# Patient Record
Sex: MALE | Race: WHITE | NOT HISPANIC OR LATINO | Employment: OTHER | URBAN - METROPOLITAN AREA
[De-identification: names, ages, dates, MRNs, and addresses within clinical notes are randomized per-mention and may not be internally consistent; named-entity substitution may affect disease eponyms.]

---

## 2019-04-29 ENCOUNTER — TRANSFERRED RECORDS (OUTPATIENT)
Dept: HEALTH INFORMATION MANAGEMENT | Facility: CLINIC | Age: 79
End: 2019-04-29

## 2019-05-03 RX ORDER — ALLOPURINOL 100 MG/1
100 TABLET ORAL 2 TIMES DAILY
COMMUNITY

## 2019-05-03 RX ORDER — LATANOPROST 50 UG/ML
1 SOLUTION/ DROPS OPHTHALMIC AT BEDTIME
COMMUNITY

## 2019-05-03 RX ORDER — LOSARTAN POTASSIUM 100 MG/1
100 TABLET ORAL DAILY
COMMUNITY

## 2019-05-03 RX ORDER — HYDROCHLOROTHIAZIDE 25 MG/1
25 TABLET ORAL DAILY
COMMUNITY

## 2019-05-03 RX ORDER — METOPROLOL SUCCINATE 25 MG/1
25 TABLET, EXTENDED RELEASE ORAL DAILY
COMMUNITY

## 2019-05-03 RX ORDER — ASPIRIN 81 MG/1
81 TABLET ORAL DAILY
Status: ON HOLD | COMMUNITY
End: 2019-05-07

## 2019-05-03 RX ORDER — VITS A,C,E/LUTEIN/MINERALS 300MCG-200
1 TABLET ORAL 2 TIMES DAILY
Status: ON HOLD | COMMUNITY
End: 2019-05-06

## 2019-05-03 RX ORDER — SIMVASTATIN 40 MG
40 TABLET ORAL AT BEDTIME
COMMUNITY

## 2019-05-03 RX ORDER — TAMSULOSIN HYDROCHLORIDE 0.4 MG/1
0.4 CAPSULE ORAL AT BEDTIME
COMMUNITY

## 2019-05-03 RX ORDER — CHOLECALCIFEROL (VITAMIN D3) 50 MCG
1 TABLET ORAL DAILY
Status: ON HOLD | COMMUNITY
End: 2019-05-06

## 2019-05-04 ENCOUNTER — ANESTHESIA EVENT (OUTPATIENT)
Dept: SURGERY | Facility: CLINIC | Age: 79
DRG: 470 | End: 2019-05-04
Payer: COMMERCIAL

## 2019-05-06 ENCOUNTER — HOSPITAL ENCOUNTER (INPATIENT)
Facility: CLINIC | Age: 79
LOS: 1 days | Discharge: HOME | DRG: 470 | End: 2019-05-07
Attending: ORTHOPAEDIC SURGERY | Admitting: ORTHOPAEDIC SURGERY
Payer: COMMERCIAL

## 2019-05-06 ENCOUNTER — APPOINTMENT (OUTPATIENT)
Dept: GENERAL RADIOLOGY | Facility: CLINIC | Age: 79
DRG: 470 | End: 2019-05-06
Attending: ORTHOPAEDIC SURGERY
Payer: COMMERCIAL

## 2019-05-06 ENCOUNTER — ANESTHESIA (OUTPATIENT)
Dept: SURGERY | Facility: CLINIC | Age: 79
DRG: 470 | End: 2019-05-06
Payer: COMMERCIAL

## 2019-05-06 DIAGNOSIS — Z96.642 STATUS POST LEFT HIP REPLACEMENT: Primary | ICD-10-CM

## 2019-05-06 LAB
ABO + RH BLD: NORMAL
ABO + RH BLD: NORMAL
BLD GP AB SCN SERPL QL: NORMAL
BLOOD BANK CMNT PATIENT-IMP: NORMAL
CREAT SERPL-MCNC: 1.12 MG/DL (ref 0.66–1.25)
GFR SERPL CREATININE-BSD FRML MDRD: 62 ML/MIN/{1.73_M2}
HGB BLD-MCNC: 14.3 G/DL (ref 13.3–17.7)
PLATELET # BLD AUTO: 220 10E9/L (ref 150–450)
POTASSIUM SERPL-SCNC: 3.6 MMOL/L (ref 3.4–5.3)
SPECIMEN EXP DATE BLD: NORMAL

## 2019-05-06 PROCEDURE — 36000065 ZZH SURGERY LEVEL 4 W FLUORO 1ST 30 MIN: Performed by: ORTHOPAEDIC SURGERY

## 2019-05-06 PROCEDURE — 88311 DECALCIFY TISSUE: CPT | Performed by: ORTHOPAEDIC SURGERY

## 2019-05-06 PROCEDURE — 12000000 ZZH R&B MED SURG/OB

## 2019-05-06 PROCEDURE — 25800025 ZZH RX 258: Performed by: ORTHOPAEDIC SURGERY

## 2019-05-06 PROCEDURE — C1713 ANCHOR/SCREW BN/BN,TIS/BN: HCPCS | Performed by: ORTHOPAEDIC SURGERY

## 2019-05-06 PROCEDURE — 25000128 H RX IP 250 OP 636: Performed by: ORTHOPAEDIC SURGERY

## 2019-05-06 PROCEDURE — 25000125 ZZHC RX 250: Performed by: PHYSICIAN ASSISTANT

## 2019-05-06 PROCEDURE — 88305 TISSUE EXAM BY PATHOLOGIST: CPT | Mod: 26 | Performed by: ORTHOPAEDIC SURGERY

## 2019-05-06 PROCEDURE — 25000566 ZZH SEVOFLURANE, EA 15 MIN: Performed by: ORTHOPAEDIC SURGERY

## 2019-05-06 PROCEDURE — 0SRB03A REPLACEMENT OF LEFT HIP JOINT WITH CERAMIC SYNTHETIC SUBSTITUTE, UNCEMENTED, OPEN APPROACH: ICD-10-PCS | Performed by: ORTHOPAEDIC SURGERY

## 2019-05-06 PROCEDURE — 27210794 ZZH OR GENERAL SUPPLY STERILE: Performed by: ORTHOPAEDIC SURGERY

## 2019-05-06 PROCEDURE — 25000125 ZZHC RX 250: Performed by: NURSE ANESTHETIST, CERTIFIED REGISTERED

## 2019-05-06 PROCEDURE — 86901 BLOOD TYPING SEROLOGIC RH(D): CPT | Performed by: ANESTHESIOLOGY

## 2019-05-06 PROCEDURE — 37000008 ZZH ANESTHESIA TECHNICAL FEE, 1ST 30 MIN: Performed by: ORTHOPAEDIC SURGERY

## 2019-05-06 PROCEDURE — 25000128 H RX IP 250 OP 636: Performed by: PHYSICIAN ASSISTANT

## 2019-05-06 PROCEDURE — 25000132 ZZH RX MED GY IP 250 OP 250 PS 637: Performed by: ORTHOPAEDIC SURGERY

## 2019-05-06 PROCEDURE — C1776 JOINT DEVICE (IMPLANTABLE): HCPCS | Performed by: ORTHOPAEDIC SURGERY

## 2019-05-06 PROCEDURE — 25800030 ZZH RX IP 258 OP 636: Performed by: ANESTHESIOLOGY

## 2019-05-06 PROCEDURE — 93005 ELECTROCARDIOGRAM TRACING: CPT

## 2019-05-06 PROCEDURE — 36415 COLL VENOUS BLD VENIPUNCTURE: CPT | Performed by: PHYSICIAN ASSISTANT

## 2019-05-06 PROCEDURE — 85049 AUTOMATED PLATELET COUNT: CPT | Performed by: PHYSICIAN ASSISTANT

## 2019-05-06 PROCEDURE — 82565 ASSAY OF CREATININE: CPT | Performed by: PHYSICIAN ASSISTANT

## 2019-05-06 PROCEDURE — 86900 BLOOD TYPING SEROLOGIC ABO: CPT | Performed by: ANESTHESIOLOGY

## 2019-05-06 PROCEDURE — 86850 RBC ANTIBODY SCREEN: CPT | Performed by: ANESTHESIOLOGY

## 2019-05-06 PROCEDURE — 99207 ZZC CONSULT E&M CHANGED TO INITIAL LEVEL: CPT | Performed by: PHYSICIAN ASSISTANT

## 2019-05-06 PROCEDURE — 85018 HEMOGLOBIN: CPT | Performed by: PHYSICIAN ASSISTANT

## 2019-05-06 PROCEDURE — 25000132 ZZH RX MED GY IP 250 OP 250 PS 637: Performed by: PHYSICIAN ASSISTANT

## 2019-05-06 PROCEDURE — 40000985 XR PELVIS AND HIP PORTABLE BILATERAL 2 VIEWS

## 2019-05-06 PROCEDURE — 99222 1ST HOSP IP/OBS MODERATE 55: CPT | Performed by: PHYSICIAN ASSISTANT

## 2019-05-06 PROCEDURE — 40000277 XR SURGERY CARM FLUORO LESS THAN 5 MIN W STILLS

## 2019-05-06 PROCEDURE — 93010 ELECTROCARDIOGRAM REPORT: CPT | Performed by: INTERNAL MEDICINE

## 2019-05-06 PROCEDURE — 25800030 ZZH RX IP 258 OP 636: Performed by: ORTHOPAEDIC SURGERY

## 2019-05-06 PROCEDURE — 25800030 ZZH RX IP 258 OP 636: Performed by: NURSE ANESTHETIST, CERTIFIED REGISTERED

## 2019-05-06 PROCEDURE — 0QH634Z INSERTION OF INTERNAL FIXATION DEVICE INTO RIGHT UPPER FEMUR, PERCUTANEOUS APPROACH: ICD-10-PCS | Performed by: ORTHOPAEDIC SURGERY

## 2019-05-06 PROCEDURE — 25000128 H RX IP 250 OP 636: Performed by: ANESTHESIOLOGY

## 2019-05-06 PROCEDURE — 25800030 ZZH RX IP 258 OP 636: Performed by: PHYSICIAN ASSISTANT

## 2019-05-06 PROCEDURE — 84132 ASSAY OF SERUM POTASSIUM: CPT | Performed by: PHYSICIAN ASSISTANT

## 2019-05-06 PROCEDURE — P9041 ALBUMIN (HUMAN),5%, 50ML: HCPCS | Performed by: NURSE ANESTHETIST, CERTIFIED REGISTERED

## 2019-05-06 PROCEDURE — 88305 TISSUE EXAM BY PATHOLOGIST: CPT | Performed by: ORTHOPAEDIC SURGERY

## 2019-05-06 PROCEDURE — 25000128 H RX IP 250 OP 636: Performed by: NURSE ANESTHETIST, CERTIFIED REGISTERED

## 2019-05-06 PROCEDURE — 25000125 ZZHC RX 250: Performed by: ORTHOPAEDIC SURGERY

## 2019-05-06 PROCEDURE — 36000063 ZZH SURGERY LEVEL 4 EA 15 ADDTL MIN: Performed by: ORTHOPAEDIC SURGERY

## 2019-05-06 PROCEDURE — 88311 DECALCIFY TISSUE: CPT | Mod: 26 | Performed by: ORTHOPAEDIC SURGERY

## 2019-05-06 PROCEDURE — 71000013 ZZH RECOVERY PHASE 1 LEVEL 1 EA ADDTL HR: Performed by: ORTHOPAEDIC SURGERY

## 2019-05-06 PROCEDURE — 37000009 ZZH ANESTHESIA TECHNICAL FEE, EACH ADDTL 15 MIN: Performed by: ORTHOPAEDIC SURGERY

## 2019-05-06 PROCEDURE — 40000170 ZZH STATISTIC PRE-PROCEDURE ASSESSMENT II: Performed by: ORTHOPAEDIC SURGERY

## 2019-05-06 PROCEDURE — 71000012 ZZH RECOVERY PHASE 1 LEVEL 1 FIRST HR: Performed by: ORTHOPAEDIC SURGERY

## 2019-05-06 DEVICE — IMPLANTABLE DEVICE: Type: IMPLANTABLE DEVICE | Site: FEMUR | Status: FUNCTIONAL

## 2019-05-06 DEVICE — IMP CUP ACE PINNACLE 58MM 1217-22-058: Type: IMPLANTABLE DEVICE | Site: HIP | Status: FUNCTIONAL

## 2019-05-06 DEVICE — IMP INSERT HIP DEPUY PINNACLE ALTRX 36MM +4 1221-36-458: Type: IMPLANTABLE DEVICE | Site: HIP | Status: FUNCTIONAL

## 2019-05-06 DEVICE — IMP SCR BONE CAN ACE 6.5X35MM 1217-35-500: Type: IMPLANTABLE DEVICE | Site: HIP | Status: FUNCTIONAL

## 2019-05-06 DEVICE — IMP APEX HOLE ELIMINATOR HIP DEPUY DURALOC 1246-03-000: Type: IMPLANTABLE DEVICE | Site: HIP | Status: FUNCTIONAL

## 2019-05-06 DEVICE — IMPLANTABLE DEVICE: Type: IMPLANTABLE DEVICE | Site: HIP | Status: FUNCTIONAL

## 2019-05-06 RX ORDER — ALLOPURINOL 100 MG/1
100 TABLET ORAL 2 TIMES DAILY
Status: DISCONTINUED | OUTPATIENT
Start: 2019-05-06 | End: 2019-05-07 | Stop reason: HOSPADM

## 2019-05-06 RX ORDER — PROPOFOL 10 MG/ML
INJECTION, EMULSION INTRAVENOUS PRN
Status: DISCONTINUED | OUTPATIENT
Start: 2019-05-06 | End: 2019-05-06

## 2019-05-06 RX ORDER — ACETAMINOPHEN 325 MG/1
650 TABLET ORAL EVERY 4 HOURS PRN
Status: DISCONTINUED | OUTPATIENT
Start: 2019-05-09 | End: 2019-05-07 | Stop reason: HOSPADM

## 2019-05-06 RX ORDER — LIDOCAINE HYDROCHLORIDE 20 MG/ML
JELLY TOPICAL PRN
Status: DISCONTINUED | OUTPATIENT
Start: 2019-05-06 | End: 2019-05-06 | Stop reason: HOSPADM

## 2019-05-06 RX ORDER — METOPROLOL SUCCINATE 25 MG/1
25 TABLET, EXTENDED RELEASE ORAL DAILY
Status: DISCONTINUED | OUTPATIENT
Start: 2019-05-07 | End: 2019-05-07 | Stop reason: HOSPADM

## 2019-05-06 RX ORDER — VANCOMYCIN HYDROCHLORIDE 1 G/20ML
INJECTION, POWDER, LYOPHILIZED, FOR SOLUTION INTRAVENOUS PRN
Status: DISCONTINUED | OUTPATIENT
Start: 2019-05-06 | End: 2019-05-06 | Stop reason: HOSPADM

## 2019-05-06 RX ORDER — HYDROMORPHONE HYDROCHLORIDE 1 MG/ML
.3-.5 INJECTION, SOLUTION INTRAMUSCULAR; INTRAVENOUS; SUBCUTANEOUS
Status: DISCONTINUED | OUTPATIENT
Start: 2019-05-06 | End: 2019-05-07 | Stop reason: HOSPADM

## 2019-05-06 RX ORDER — GLYCOPYRROLATE 0.2 MG/ML
INJECTION, SOLUTION INTRAMUSCULAR; INTRAVENOUS PRN
Status: DISCONTINUED | OUTPATIENT
Start: 2019-05-06 | End: 2019-05-06

## 2019-05-06 RX ORDER — ONDANSETRON 2 MG/ML
4 INJECTION INTRAMUSCULAR; INTRAVENOUS EVERY 30 MIN PRN
Status: DISCONTINUED | OUTPATIENT
Start: 2019-05-06 | End: 2019-05-06 | Stop reason: HOSPADM

## 2019-05-06 RX ORDER — CYANOCOBALAMIN (VITAMIN B-12) 2500 MCG
2500 TABLET, SUBLINGUAL SUBLINGUAL DAILY
COMMUNITY
End: 2024-05-24

## 2019-05-06 RX ORDER — SIMVASTATIN 40 MG
40 TABLET ORAL AT BEDTIME
Status: DISCONTINUED | OUTPATIENT
Start: 2019-05-06 | End: 2019-05-07 | Stop reason: HOSPADM

## 2019-05-06 RX ORDER — HYDROMORPHONE HYDROCHLORIDE 1 MG/ML
.3-.5 INJECTION, SOLUTION INTRAMUSCULAR; INTRAVENOUS; SUBCUTANEOUS EVERY 5 MIN PRN
Status: DISCONTINUED | OUTPATIENT
Start: 2019-05-06 | End: 2019-05-06 | Stop reason: HOSPADM

## 2019-05-06 RX ORDER — ACETAMINOPHEN 325 MG/1
975 TABLET ORAL EVERY 8 HOURS SCHEDULED
Status: DISCONTINUED | OUTPATIENT
Start: 2019-05-06 | End: 2019-05-07 | Stop reason: HOSPADM

## 2019-05-06 RX ORDER — ONDANSETRON 4 MG/1
4 TABLET, ORALLY DISINTEGRATING ORAL EVERY 30 MIN PRN
Status: DISCONTINUED | OUTPATIENT
Start: 2019-05-06 | End: 2019-05-06 | Stop reason: HOSPADM

## 2019-05-06 RX ORDER — SODIUM CHLORIDE, SODIUM LACTATE, POTASSIUM CHLORIDE, CALCIUM CHLORIDE 600; 310; 30; 20 MG/100ML; MG/100ML; MG/100ML; MG/100ML
INJECTION, SOLUTION INTRAVENOUS CONTINUOUS PRN
Status: DISCONTINUED | OUTPATIENT
Start: 2019-05-06 | End: 2019-05-06

## 2019-05-06 RX ORDER — SODIUM CHLORIDE, SODIUM LACTATE, POTASSIUM CHLORIDE, CALCIUM CHLORIDE 600; 310; 30; 20 MG/100ML; MG/100ML; MG/100ML; MG/100ML
INJECTION, SOLUTION INTRAVENOUS CONTINUOUS
Status: DISCONTINUED | OUTPATIENT
Start: 2019-05-06 | End: 2019-05-06 | Stop reason: HOSPADM

## 2019-05-06 RX ORDER — PROCHLORPERAZINE MALEATE 5 MG
5 TABLET ORAL EVERY 6 HOURS PRN
Status: DISCONTINUED | OUTPATIENT
Start: 2019-05-06 | End: 2019-05-07 | Stop reason: HOSPADM

## 2019-05-06 RX ORDER — LIDOCAINE HYDROCHLORIDE 20 MG/ML
INJECTION, SOLUTION INFILTRATION; PERINEURAL PRN
Status: DISCONTINUED | OUTPATIENT
Start: 2019-05-06 | End: 2019-05-06

## 2019-05-06 RX ORDER — EPHEDRINE SULFATE 50 MG/ML
INJECTION, SOLUTION INTRAMUSCULAR; INTRAVENOUS; SUBCUTANEOUS PRN
Status: DISCONTINUED | OUTPATIENT
Start: 2019-05-06 | End: 2019-05-06

## 2019-05-06 RX ORDER — KETOROLAC TROMETHAMINE 30 MG/ML
INJECTION, SOLUTION INTRAMUSCULAR; INTRAVENOUS PRN
Status: DISCONTINUED | OUTPATIENT
Start: 2019-05-06 | End: 2019-05-06 | Stop reason: HOSPADM

## 2019-05-06 RX ORDER — CEFAZOLIN SODIUM 2 G/100ML
2 INJECTION, SOLUTION INTRAVENOUS
Status: COMPLETED | OUTPATIENT
Start: 2019-05-06 | End: 2019-05-06

## 2019-05-06 RX ORDER — NITROGLYCERIN 0.4 MG/1
0.4 TABLET SUBLINGUAL EVERY 5 MIN PRN
COMMUNITY
End: 2024-05-24

## 2019-05-06 RX ORDER — LIDOCAINE 40 MG/G
CREAM TOPICAL
Status: DISCONTINUED | OUTPATIENT
Start: 2019-05-06 | End: 2019-05-07 | Stop reason: HOSPADM

## 2019-05-06 RX ORDER — NEOSTIGMINE METHYLSULFATE 1 MG/ML
VIAL (ML) INJECTION PRN
Status: DISCONTINUED | OUTPATIENT
Start: 2019-05-06 | End: 2019-05-06

## 2019-05-06 RX ORDER — ACETAMINOPHEN 500 MG
1000 TABLET ORAL ONCE
Status: COMPLETED | OUTPATIENT
Start: 2019-05-06 | End: 2019-05-06

## 2019-05-06 RX ORDER — DEXAMETHASONE SODIUM PHOSPHATE 4 MG/ML
INJECTION, SOLUTION INTRA-ARTICULAR; INTRALESIONAL; INTRAMUSCULAR; INTRAVENOUS; SOFT TISSUE PRN
Status: DISCONTINUED | OUTPATIENT
Start: 2019-05-06 | End: 2019-05-06

## 2019-05-06 RX ORDER — CEFAZOLIN SODIUM 1 G/3ML
1 INJECTION, POWDER, FOR SOLUTION INTRAMUSCULAR; INTRAVENOUS SEE ADMIN INSTRUCTIONS
Status: DISCONTINUED | OUTPATIENT
Start: 2019-05-06 | End: 2019-05-06 | Stop reason: HOSPADM

## 2019-05-06 RX ORDER — KETOROLAC TROMETHAMINE 15 MG/ML
15 INJECTION, SOLUTION INTRAMUSCULAR; INTRAVENOUS EVERY 6 HOURS
Status: DISCONTINUED | OUTPATIENT
Start: 2019-05-06 | End: 2019-05-07 | Stop reason: HOSPADM

## 2019-05-06 RX ORDER — AMOXICILLIN 250 MG
2 CAPSULE ORAL 2 TIMES DAILY
Status: DISCONTINUED | OUTPATIENT
Start: 2019-05-06 | End: 2019-05-07 | Stop reason: HOSPADM

## 2019-05-06 RX ORDER — METOPROLOL TARTRATE 1 MG/ML
5 INJECTION, SOLUTION INTRAVENOUS EVERY 6 HOURS
Status: DISCONTINUED | OUTPATIENT
Start: 2019-05-07 | End: 2019-05-06

## 2019-05-06 RX ORDER — LOSARTAN POTASSIUM 100 MG/1
100 TABLET ORAL DAILY
Status: DISCONTINUED | OUTPATIENT
Start: 2019-05-07 | End: 2019-05-07 | Stop reason: HOSPADM

## 2019-05-06 RX ORDER — ONDANSETRON 2 MG/ML
INJECTION INTRAMUSCULAR; INTRAVENOUS PRN
Status: DISCONTINUED | OUTPATIENT
Start: 2019-05-06 | End: 2019-05-06

## 2019-05-06 RX ORDER — ALBUMIN, HUMAN INJ 5% 5 %
SOLUTION INTRAVENOUS CONTINUOUS PRN
Status: DISCONTINUED | OUTPATIENT
Start: 2019-05-06 | End: 2019-05-06

## 2019-05-06 RX ORDER — AMOXICILLIN 250 MG
1 CAPSULE ORAL 2 TIMES DAILY
Status: DISCONTINUED | OUTPATIENT
Start: 2019-05-06 | End: 2019-05-07 | Stop reason: HOSPADM

## 2019-05-06 RX ORDER — OXYCODONE HYDROCHLORIDE 5 MG/1
5-10 TABLET ORAL
Status: DISCONTINUED | OUTPATIENT
Start: 2019-05-06 | End: 2019-05-07 | Stop reason: HOSPADM

## 2019-05-06 RX ORDER — VIT C/E/ZN/COPPR/LUTEIN/ZEAXAN 60 MG-6 MG
1 CAPSULE ORAL 2 TIMES DAILY
Status: DISCONTINUED | OUTPATIENT
Start: 2019-05-06 | End: 2019-05-07 | Stop reason: HOSPADM

## 2019-05-06 RX ORDER — ONDANSETRON 2 MG/ML
4 INJECTION INTRAMUSCULAR; INTRAVENOUS EVERY 6 HOURS PRN
Status: DISCONTINUED | OUTPATIENT
Start: 2019-05-06 | End: 2019-05-07 | Stop reason: HOSPADM

## 2019-05-06 RX ORDER — LATANOPROST 50 UG/ML
1 SOLUTION/ DROPS OPHTHALMIC AT BEDTIME
Status: DISCONTINUED | OUTPATIENT
Start: 2019-05-06 | End: 2019-05-07 | Stop reason: HOSPADM

## 2019-05-06 RX ORDER — LACTOBACILLUS RHAMNOSUS GG 10B CELL
1 CAPSULE ORAL DAILY
Status: DISCONTINUED | OUTPATIENT
Start: 2019-05-07 | End: 2019-05-07 | Stop reason: HOSPADM

## 2019-05-06 RX ORDER — TAMSULOSIN HYDROCHLORIDE 0.4 MG/1
0.4 CAPSULE ORAL AT BEDTIME
Status: DISCONTINUED | OUTPATIENT
Start: 2019-05-06 | End: 2019-05-07 | Stop reason: HOSPADM

## 2019-05-06 RX ORDER — HYDROXYZINE HYDROCHLORIDE 10 MG/1
10 TABLET, FILM COATED ORAL EVERY 6 HOURS PRN
Status: DISCONTINUED | OUTPATIENT
Start: 2019-05-06 | End: 2019-05-07 | Stop reason: HOSPADM

## 2019-05-06 RX ORDER — NALOXONE HYDROCHLORIDE 0.4 MG/ML
.1-.4 INJECTION, SOLUTION INTRAMUSCULAR; INTRAVENOUS; SUBCUTANEOUS
Status: DISCONTINUED | OUTPATIENT
Start: 2019-05-06 | End: 2019-05-06

## 2019-05-06 RX ORDER — FENTANYL CITRATE 50 UG/ML
INJECTION, SOLUTION INTRAMUSCULAR; INTRAVENOUS PRN
Status: DISCONTINUED | OUTPATIENT
Start: 2019-05-06 | End: 2019-05-06

## 2019-05-06 RX ORDER — TEMAZEPAM 7.5 MG/1
7.5 CAPSULE ORAL
Status: DISCONTINUED | OUTPATIENT
Start: 2019-05-07 | End: 2019-05-07 | Stop reason: HOSPADM

## 2019-05-06 RX ORDER — MAGNESIUM HYDROXIDE 1200 MG/15ML
LIQUID ORAL PRN
Status: DISCONTINUED | OUTPATIENT
Start: 2019-05-06 | End: 2019-05-06 | Stop reason: HOSPADM

## 2019-05-06 RX ORDER — CEFAZOLIN SODIUM 2 G/100ML
2 INJECTION, SOLUTION INTRAVENOUS EVERY 8 HOURS
Status: COMPLETED | OUTPATIENT
Start: 2019-05-06 | End: 2019-05-07

## 2019-05-06 RX ORDER — ONDANSETRON 4 MG/1
4 TABLET, ORALLY DISINTEGRATING ORAL EVERY 6 HOURS PRN
Status: DISCONTINUED | OUTPATIENT
Start: 2019-05-06 | End: 2019-05-07 | Stop reason: HOSPADM

## 2019-05-06 RX ORDER — VECURONIUM BROMIDE 1 MG/ML
INJECTION, POWDER, LYOPHILIZED, FOR SOLUTION INTRAVENOUS PRN
Status: DISCONTINUED | OUTPATIENT
Start: 2019-05-06 | End: 2019-05-06

## 2019-05-06 RX ORDER — FENTANYL CITRATE 50 UG/ML
25-50 INJECTION, SOLUTION INTRAMUSCULAR; INTRAVENOUS
Status: DISCONTINUED | OUTPATIENT
Start: 2019-05-06 | End: 2019-05-06 | Stop reason: HOSPADM

## 2019-05-06 RX ORDER — DEXTROSE MONOHYDRATE, SODIUM CHLORIDE, AND POTASSIUM CHLORIDE 50; 1.49; 4.5 G/1000ML; G/1000ML; G/1000ML
INJECTION, SOLUTION INTRAVENOUS CONTINUOUS
Status: DISCONTINUED | OUTPATIENT
Start: 2019-05-06 | End: 2019-05-07 | Stop reason: HOSPADM

## 2019-05-06 RX ORDER — PREGABALIN 75 MG/1
75 CAPSULE ORAL DAILY
Status: COMPLETED | OUTPATIENT
Start: 2019-05-06 | End: 2019-05-06

## 2019-05-06 RX ORDER — FAMOTIDINE 20 MG
1000 TABLET ORAL DAILY
COMMUNITY
End: 2024-05-24

## 2019-05-06 RX ORDER — NALOXONE HYDROCHLORIDE 0.4 MG/ML
.1-.4 INJECTION, SOLUTION INTRAMUSCULAR; INTRAVENOUS; SUBCUTANEOUS
Status: DISCONTINUED | OUTPATIENT
Start: 2019-05-06 | End: 2019-05-07 | Stop reason: HOSPADM

## 2019-05-06 RX ADMIN — LIDOCAINE HYDROCHLORIDE 80 MG: 20 INJECTION, SOLUTION INFILTRATION; PERINEURAL at 12:46

## 2019-05-06 RX ADMIN — DEXAMETHASONE SODIUM PHOSPHATE 4 MG: 4 INJECTION, SOLUTION INTRA-ARTICULAR; INTRALESIONAL; INTRAMUSCULAR; INTRAVENOUS; SOFT TISSUE at 13:26

## 2019-05-06 RX ADMIN — FENTANYL CITRATE 50 MCG: 50 INJECTION, SOLUTION INTRAMUSCULAR; INTRAVENOUS at 15:01

## 2019-05-06 RX ADMIN — Medication 0.5 MG: at 17:52

## 2019-05-06 RX ADMIN — Medication 5 MG: at 13:01

## 2019-05-06 RX ADMIN — CEFAZOLIN 1 G: 1 INJECTION, POWDER, FOR SOLUTION INTRAMUSCULAR; INTRAVENOUS at 14:55

## 2019-05-06 RX ADMIN — FENTANYL CITRATE 100 MCG: 50 INJECTION, SOLUTION INTRAMUSCULAR; INTRAVENOUS at 12:46

## 2019-05-06 RX ADMIN — PHENYLEPHRINE HYDROCHLORIDE 100 MCG: 10 INJECTION, SOLUTION INTRAMUSCULAR; INTRAVENOUS; SUBCUTANEOUS at 13:59

## 2019-05-06 RX ADMIN — SODIUM CHLORIDE, POTASSIUM CHLORIDE, SODIUM LACTATE AND CALCIUM CHLORIDE: 600; 310; 30; 20 INJECTION, SOLUTION INTRAVENOUS at 11:20

## 2019-05-06 RX ADMIN — ROCURONIUM BROMIDE 50 MG: 10 INJECTION INTRAVENOUS at 12:46

## 2019-05-06 RX ADMIN — PHENYLEPHRINE HYDROCHLORIDE 100 MCG: 10 INJECTION, SOLUTION INTRAMUSCULAR; INTRAVENOUS; SUBCUTANEOUS at 13:16

## 2019-05-06 RX ADMIN — HYDROMORPHONE HYDROCHLORIDE 0.5 MG: 1 INJECTION, SOLUTION INTRAMUSCULAR; INTRAVENOUS; SUBCUTANEOUS at 15:14

## 2019-05-06 RX ADMIN — VECURONIUM BROMIDE 1 MG: 1 INJECTION, POWDER, LYOPHILIZED, FOR SOLUTION INTRAVENOUS at 15:34

## 2019-05-06 RX ADMIN — CEFAZOLIN SODIUM 2 G: 2 INJECTION, SOLUTION INTRAVENOUS at 13:30

## 2019-05-06 RX ADMIN — SIMVASTATIN 40 MG: 40 TABLET, FILM COATED ORAL at 21:08

## 2019-05-06 RX ADMIN — Medication 5 MG: at 13:26

## 2019-05-06 RX ADMIN — FENTANYL CITRATE 50 MCG: 50 INJECTION, SOLUTION INTRAMUSCULAR; INTRAVENOUS at 15:08

## 2019-05-06 RX ADMIN — TAMSULOSIN HYDROCHLORIDE 0.4 MG: 0.4 CAPSULE ORAL at 21:08

## 2019-05-06 RX ADMIN — SODIUM CHLORIDE, POTASSIUM CHLORIDE, SODIUM LACTATE AND CALCIUM CHLORIDE: 600; 310; 30; 20 INJECTION, SOLUTION INTRAVENOUS at 12:55

## 2019-05-06 RX ADMIN — Medication 5 MG: at 13:56

## 2019-05-06 RX ADMIN — ALLOPURINOL 100 MG: 100 TABLET ORAL at 21:08

## 2019-05-06 RX ADMIN — PROPOFOL 160 MG: 10 INJECTION, EMULSION INTRAVENOUS at 12:46

## 2019-05-06 RX ADMIN — VECURONIUM BROMIDE 2 MG: 1 INJECTION, POWDER, LYOPHILIZED, FOR SOLUTION INTRAVENOUS at 14:37

## 2019-05-06 RX ADMIN — DEXMEDETOMIDINE HYDROCHLORIDE 8 MCG: 100 INJECTION, SOLUTION INTRAVENOUS at 16:49

## 2019-05-06 RX ADMIN — SODIUM CHLORIDE, POTASSIUM CHLORIDE, SODIUM LACTATE AND CALCIUM CHLORIDE: 600; 310; 30; 20 INJECTION, SOLUTION INTRAVENOUS at 14:25

## 2019-05-06 RX ADMIN — VECURONIUM BROMIDE 1 MG: 1 INJECTION, POWDER, LYOPHILIZED, FOR SOLUTION INTRAVENOUS at 16:03

## 2019-05-06 RX ADMIN — PHENYLEPHRINE HYDROCHLORIDE 100 MCG: 10 INJECTION, SOLUTION INTRAMUSCULAR; INTRAVENOUS; SUBCUTANEOUS at 14:20

## 2019-05-06 RX ADMIN — Medication 5 MG: at 13:03

## 2019-05-06 RX ADMIN — Medication 5 MG: at 14:41

## 2019-05-06 RX ADMIN — ALBUMIN HUMAN: 0.05 INJECTION, SOLUTION INTRAVENOUS at 15:39

## 2019-05-06 RX ADMIN — PREGABALIN 75 MG: 75 CAPSULE ORAL at 11:20

## 2019-05-06 RX ADMIN — PHENYLEPHRINE HYDROCHLORIDE 100 MCG: 10 INJECTION, SOLUTION INTRAMUSCULAR; INTRAVENOUS; SUBCUTANEOUS at 13:23

## 2019-05-06 RX ADMIN — VECURONIUM BROMIDE 2 MG: 1 INJECTION, POWDER, LYOPHILIZED, FOR SOLUTION INTRAVENOUS at 13:57

## 2019-05-06 RX ADMIN — Medication 0.5 MG: at 18:03

## 2019-05-06 RX ADMIN — NEOSTIGMINE METHYLSULFATE 4 MG: 1 INJECTION, SOLUTION INTRAVENOUS at 16:45

## 2019-05-06 RX ADMIN — KETOROLAC TROMETHAMINE 15 MG: 15 INJECTION, SOLUTION INTRAMUSCULAR; INTRAVENOUS at 21:07

## 2019-05-06 RX ADMIN — SENNOSIDES AND DOCUSATE SODIUM 1 TABLET: 8.6; 5 TABLET ORAL at 21:08

## 2019-05-06 RX ADMIN — FENTANYL CITRATE 50 MCG: 50 INJECTION, SOLUTION INTRAMUSCULAR; INTRAVENOUS at 15:20

## 2019-05-06 RX ADMIN — DEXMEDETOMIDINE HYDROCHLORIDE 0.3 MCG/KG/HR: 100 INJECTION, SOLUTION INTRAVENOUS at 13:00

## 2019-05-06 RX ADMIN — TRANEXAMIC ACID 1 G: 1 INJECTION, SOLUTION INTRAVENOUS at 13:13

## 2019-05-06 RX ADMIN — ONDANSETRON 4 MG: 2 INJECTION INTRAMUSCULAR; INTRAVENOUS at 16:34

## 2019-05-06 RX ADMIN — Medication 1 CAPSULE: at 21:08

## 2019-05-06 RX ADMIN — ACETAMINOPHEN 975 MG: 325 TABLET, FILM COATED ORAL at 21:08

## 2019-05-06 RX ADMIN — GLYCOPYRROLATE 0.6 MG: 0.2 INJECTION, SOLUTION INTRAMUSCULAR; INTRAVENOUS at 16:45

## 2019-05-06 RX ADMIN — TRANEXAMIC ACID 1 G: 1 INJECTION, SOLUTION INTRAVENOUS at 16:33

## 2019-05-06 RX ADMIN — CEFAZOLIN SODIUM 2 G: 2 INJECTION, SOLUTION INTRAVENOUS at 23:28

## 2019-05-06 RX ADMIN — ACETAMINOPHEN 1000 MG: 500 TABLET, FILM COATED ORAL at 11:20

## 2019-05-06 RX ADMIN — POTASSIUM CHLORIDE, DEXTROSE MONOHYDRATE AND SODIUM CHLORIDE: 150; 5; 450 INJECTION, SOLUTION INTRAVENOUS at 20:55

## 2019-05-06 ASSESSMENT — ENCOUNTER SYMPTOMS: SEIZURES: 0

## 2019-05-06 ASSESSMENT — MIFFLIN-ST. JEOR: SCORE: 1640.92

## 2019-05-06 ASSESSMENT — LIFESTYLE VARIABLES: TOBACCO_USE: 0

## 2019-05-06 ASSESSMENT — ACTIVITIES OF DAILY LIVING (ADL): ADLS_ACUITY_SCORE: 10

## 2019-05-06 NOTE — PROGRESS NOTES
Admission medication history interview status for the 5/6/2019  admission is complete. See EPIC admission navigator for prior to admission medications     Medication history source reliability:Moderate    Medication history interview source(s):Patient    Medication history resources (including written lists, pill bottles, clinic record):None    Primary pharmacy.[facility]    Additional medication history information not noted on PTA med list :None    Time spent in this activity: 60 minutes    Prior to Admission medications    Medication Sig Last Dose Taking? Auth Provider   allopurinol (ZYLOPRIM) 100 MG tablet Take 100 mg by mouth 2 times daily 5/5/2019 at PM Yes Reported, Patient   Alpha-Lipoic Acid 300 MG TABS Take 300 mg by mouth daily 4/26/2019 at AM Yes Reported, Patient   aspirin 81 MG EC tablet Take 81 mg by mouth daily 4/26/2019 at AM Yes Reported, Patient   hydrochlorothiazide (HYDRODIURIL) 25 MG tablet Take 25 mg by mouth daily 5/5/2019 at AM Yes Reported, Patient   LACTOBACILLUS PO Take 1 capsule by mouth daily  4/26/2019 at AM Yes Reported, Patient   latanoprost (XALATAN) 0.005 % ophthalmic solution Place 1 drop into both eyes At Bedtime  5/5/2019 at HS Yes Reported, Patient   losartan (COZAAR) 100 MG tablet Take 100 mg by mouth daily 5/5/2019 at AM Yes Reported, Patient   metoprolol succinate ER (TOPROL-XL) 25 MG 24 hr tablet Take 25 mg by mouth daily 5/6/2019 at 0700 Yes Reported, Patient   Multiple Vitamins-Minerals (VITEYES AREDS FORMULA/LUTEIN PO) Take 1 capsule by mouth 2 times daily 4/26/2019 at PM Yes Reported, Patient   nitroGLYcerin (NITROSTAT) 0.4 MG sublingual tablet Place 0.4 mg under the tongue every 5 minutes as needed for chest pain For chest pain place 1 tablet under the tongue every 5 minutes for 3 doses. If symptoms persist 5 minutes after 1st dose call 911. More than a year at PRN Yes Reported, Patient   rivaroxaban ANTICOAGULANT (XARELTO) 20 MG TABS tablet Take 20 mg by mouth every evening   4/29/2019 at PM Yes Reported, Patient   simvastatin (ZOCOR) 40 MG tablet Take 40 mg by mouth At Bedtime 5/5/2019 at PM Yes Reported, Patient   tamsulosin (FLOMAX) 0.4 MG capsule Take 0.4 mg by mouth At Bedtime  5/5/2019 at PM Yes Reported, Patient   vitamin B-12 (CYANOCOBALAMIN) 2500 MCG sublingual tablet Take 2,500 mcg by mouth daily 4/26/2019 at AM Yes Reported, Patient   Vitamin D, Cholecalciferol, 1000 units CAPS Take 1,000 Units by mouth daily 4/26/2019 at AM Yes Reported, Patient

## 2019-05-06 NOTE — ANESTHESIA CARE TRANSFER NOTE
Patient: Chinmay Navas    Procedure(s):  DIRECT ANTERIOR LEFT TOTAL HIP ARTHROPLASTY AND RIGHT HIP NAILINIG WITH ACE CANNULATED SCREWS  RIGHT HIP NAILING WITH ACE CANNULATED SCREWS    Diagnosis: LEFT HIP FRACTURE; RIGHT HIP FRACTURE  Diagnosis Additional Information: No value filed.    Anesthesia Type:   General, ETT     Note:  Airway :Oral Airway  Patient transferred to:PACU  Comments: Patient breathing spontaneously.  Follows commands.  Suctioned and extubated.  Exchanging air well.  Transferred to PACU with 10L O2 via mask.  Monitors on.  VSS.  Patent IV.  Report and transfer of care to RN.  Handoff Report: Identifed the Patient, Identified the Reponsible Provider, Reviewed the pertinent medical history, Discussed the surgical course, Reviewed Intra-OP anesthesia mangement and issues during anesthesia, Set expectations for post-procedure period and Allowed opportunity for questions and acknowledgement of understanding      Vitals: (Last set prior to Anesthesia Care Transfer)    CRNA VITALS  5/6/2019 1636 - 5/6/2019 1716      5/6/2019             Pulse:  78    SpO2:  97 %    Resp Rate (observed):  13                Electronically Signed By: WILL Weaver CRNA  May 6, 2019  5:16 PM

## 2019-05-06 NOTE — BRIEF OP NOTE
[facility]    Brief Operative Note    Pre-operative diagnosis: Bilateral hip stress fractures  Post-operative diagnosis same  Procedure: Procedure(s): LEFT DIRECT ANTERIOR TOTAL HIP ARTHROPLASTY; RIGHT HIP PINNING WITH ACE CANNULATED SCREWS  Surgeon(s) and Role:     * Demetrius Hernandes MD - Primary     * Winter Alan PA-C - Assisting  Anesthesia: General   Estimated blood loss: 300 ml  Drains:  None  Specimens: None  Findings:  Advanced OA  Complications: None    Plan: DC home POD1 w/family assist.  DVT prophylaxis w/lovenox x2 days, then Xarelto 10mg every day x4 weeks then increase to preadmit dose of 20mg QD, ok to resume preadmit ASA 1 week after surgery.    Specimens:   ID Type Source Tests Collected by Time Destination   A : Femoral Head Tissue Other SURGICAL PATHOLOGY EXAM Demetrius Hernandes MD 5/6/2019  4:36 PM      Implants:    Implant Name Type Inv. Item Serial No.  Lot No. LRB No. Used   IMP SCR ACE CAN 6.5 [id number] Metallic Hardware/[place] IMP SCR ACE CAN 6.5 [id number]  [employer] [id number] Right 1   ACE 6.5MM CANNULATED SCREW 95MM    [employer] [id number] Right 1   IMP SCR ACE CAN 6.5 [id number] Metallic Hardware/[place] IMP SCR ACE CAN 6.5 [id number]  [employer] [id number] Right 1   IMP SCR ACE CAN 6.5 [id number] Metallic Hardware/[place] IMP SCR ACE CAN 6.5 [id number]  [employer] [id number] Right 1   IMP CUP ACE PINNACLE 58MM 1217- Total Joint Component/Insert IMP CUP ACE PINNACLE 58MM 1217-  [employer] [facility] [facility] [employer]-C [id number] Left 1   IMP APEX HOLE ELIMINATOR HIP DEPUY DURALOC 1246- Metallic Hardware/[employer] IMP APEX HOLE ELIMINATOR HIP DEPUY DURALOC 1246-  [employer]-C [id number] Left 1   IMP SCR BONE CAN ACE 6.5X25MM 1217- Metallic Hardware/[employer] IMP SCR BONE CAN ACE 6.5X25MM 1217-  [employer] [facility] [id number] Left 1   IMP SCR BONE CAN ACE 6.5X35MM 7984- Metallic  Hardware/[place] IMP SCR BONE CAN ACE 6.5X35MM 1217-  [employer] [facility] INC-C [id number] Left 1   IMP INSERT HIP DEPUY PINNACLE ALTRX 36MM +4 [id number] Total Joint Component/Insert IMP INSERT HIP DEPUY PINNACLE ALTRX 36MM +4 1221-  &amp;J [facility]   [id number] Left 1   IMP SCR BONE CAN ACE 6.5X25MM 1217- Metallic Hardware/[place] IMP SCR BONE CAN ACE 6.5X25MM 1217-  [employer] [facility] INC-C [id number] Left 1   femoral stem size 6 standard BIOLOX    Depuy [id number] Left 1   +8.5 36mm femoral head    Depuy [id number] Left 1

## 2019-05-06 NOTE — ANESTHESIA PREPROCEDURE EVALUATION
Anesthesia Pre-Procedure Evaluation    Patient: Chinmay Navas   MRN: [id number] : 1940          Preoperative Diagnosis: LEFT HIP FRACTURE; RIGHT HIP FRACTURE    Procedure(s):  DIRECT ANTERIOR LEFT TOTAL HIP ARTHROPLASTY (DEPUY)^  RIGHT HIP CANULATED SCREW (6.5/7.3 Synthes)    Past Medical History:   Diagnosis Date     Actinic keratosis      Arthritis     osteoarthritis of knee     Atrial fibrillation (H)      Coronary artery disease      Hypertension      Macular degeneration, wet (H)      Prostate cancer (H)     seeds placed     Sleep apnea     pt denies     Past Surgical History:   Procedure Laterality Date     CARDIAC SURGERY      ablation for a fib     CARDIAC SURGERY      heart stent X 1     GENITOURINARY SURGERY      brachytherapy     ORTHOPEDIC SURGERY      right shoulder replacement       Anesthesia Evaluation     . Pt has had prior anesthetic.     No history of anesthetic complications          ROS/MED HX    ENT/Pulmonary:      (-) tobacco use and sleep apnea   Neurologic:      (-) seizures and CVA   Cardiovascular:     (+) Dyslipidemia, hypertension--CAD, -past MI,-stent,. Taking blood thinners : . CHF . . :. . Previous cardiac testing Echodate:2019results:Final Impressions:   1. Normal left ventricular size, severely increased wall thickness, mildly reduced global systolic function, calculated EF of 49 %.   2. Moderate asymmetric left ventricular hypertrophy.   3. Mildly enlarged left atrium.   4. Increased LV trabeculation, consider further evaluation with cardiac MRI if clinically indicated.   5. The mitral valve is normal, mild to moderate mitral regurgitation.   6. Increased left atrial pressure.date: results: date: results: date: results:          METS/Exercise Tolerance:  >4 METS   Hematologic:         Musculoskeletal:   (+) arthritis, fracture lower extremity, -       GI/Hepatic:        (-) GERD and liver disease   Renal/Genitourinary:      (-) renal disease   Endo:      (-) Type  "II DM and thyroid disease   Psychiatric:         Infectious Disease:         Malignancy:         Other:                          Physical Exam  Normal systems: cardiovascular, pulmonary and dental    Airway   Mallampati: II  TM distance: >3 FB  Neck ROM: full    Dental     Cardiovascular       Pulmonary             Lab Results   Component Value Date    HGB 14.3 05/06/2019    POTASSIUM 3.6 05/06/2019    CR 1.12 05/06/2019       Preop Vitals  BP Readings from Last 3 Encounters:   05/06/19 160/86    Pulse Readings from Last 3 Encounters:   05/06/19 69      Resp Readings from Last 3 Encounters:   05/06/19 16    SpO2 Readings from Last 3 Encounters:   05/06/19 96%      Temp Readings from Last 1 Encounters:   05/06/19 36.7  C (98  F) (Temporal)    Ht Readings from Last 1 Encounters:   05/06/19 1.791 m (5' 10.5\")      Wt Readings from Last 1 Encounters:   05/06/19 91.2 kg (201 lb)    Estimated body mass index is 28.43 kg/m  as calculated from the following:    Height as of this encounter: 1.791 m (5' 10.5\").    Weight as of this encounter: 91.2 kg (201 lb).       Anesthesia Plan      History & Physical Review  History and physical reviewed and following examination; no interval change.    ASA Status:  3 .    NPO Status:  > 8 hours    Plan for General and ETT with Intravenous induction. Maintenance will be Balanced.    PONV prophylaxis:  Ondansetron (or other 5HT-3) and Dexamethasone or Solumedrol  Additional equipment: Videolaryngoscope and 2nd IV      Postoperative Care  Postoperative pain management:  Multi-modal analgesia.      Consents  Anesthetic plan, risks, benefits and alternatives discussed with:  Patient and Daughter/Son..                 Moises Weber MD  "

## 2019-05-07 ENCOUNTER — APPOINTMENT (OUTPATIENT)
Dept: PHYSICAL THERAPY | Facility: CLINIC | Age: 79
DRG: 470 | End: 2019-05-07
Attending: ORTHOPAEDIC SURGERY
Payer: COMMERCIAL

## 2019-05-07 ENCOUNTER — APPOINTMENT (OUTPATIENT)
Dept: OCCUPATIONAL THERAPY | Facility: CLINIC | Age: 79
DRG: 470 | End: 2019-05-07
Attending: ORTHOPAEDIC SURGERY
Payer: COMMERCIAL

## 2019-05-07 VITALS
SYSTOLIC BLOOD PRESSURE: 130 MMHG | HEIGHT: 71 IN | WEIGHT: 201 LBS | OXYGEN SATURATION: 94 % | BODY MASS INDEX: 28.14 KG/M2 | RESPIRATION RATE: 17 BRPM | DIASTOLIC BLOOD PRESSURE: 67 MMHG | HEART RATE: 67 BPM | TEMPERATURE: 98.8 F

## 2019-05-07 LAB
ANION GAP SERPL CALCULATED.3IONS-SCNC: 6 MMOL/L (ref 3–14)
BUN SERPL-MCNC: 25 MG/DL (ref 7–30)
CALCIUM SERPL-MCNC: 8.9 MG/DL (ref 8.5–10.1)
CHLORIDE SERPL-SCNC: 106 MMOL/L (ref 94–109)
CO2 SERPL-SCNC: 28 MMOL/L (ref 20–32)
CREAT SERPL-MCNC: 1.26 MG/DL (ref 0.66–1.25)
GFR SERPL CREATININE-BSD FRML MDRD: 54 ML/MIN/{1.73_M2}
GLUCOSE SERPL-MCNC: 133 MG/DL (ref 70–99)
HGB BLD-MCNC: 11.8 G/DL (ref 13.3–17.7)
POTASSIUM SERPL-SCNC: 3.8 MMOL/L (ref 3.4–5.3)
SODIUM SERPL-SCNC: 140 MMOL/L (ref 133–144)

## 2019-05-07 PROCEDURE — 25000128 H RX IP 250 OP 636: Performed by: ORTHOPAEDIC SURGERY

## 2019-05-07 PROCEDURE — 25000132 ZZH RX MED GY IP 250 OP 250 PS 637: Performed by: ORTHOPAEDIC SURGERY

## 2019-05-07 PROCEDURE — 36415 COLL VENOUS BLD VENIPUNCTURE: CPT | Performed by: ORTHOPAEDIC SURGERY

## 2019-05-07 PROCEDURE — 25000132 ZZH RX MED GY IP 250 OP 250 PS 637: Performed by: PHYSICIAN ASSISTANT

## 2019-05-07 PROCEDURE — 99232 SBSQ HOSP IP/OBS MODERATE 35: CPT | Performed by: HOSPITALIST

## 2019-05-07 PROCEDURE — 97161 PT EVAL LOW COMPLEX 20 MIN: CPT | Mod: GP

## 2019-05-07 PROCEDURE — 97530 THERAPEUTIC ACTIVITIES: CPT | Mod: GP

## 2019-05-07 PROCEDURE — 80048 BASIC METABOLIC PNL TOTAL CA: CPT | Performed by: ORTHOPAEDIC SURGERY

## 2019-05-07 PROCEDURE — 97116 GAIT TRAINING THERAPY: CPT | Mod: GP

## 2019-05-07 PROCEDURE — 97110 THERAPEUTIC EXERCISES: CPT | Mod: GP

## 2019-05-07 PROCEDURE — 97535 SELF CARE MNGMENT TRAINING: CPT | Mod: GO | Performed by: OCCUPATIONAL THERAPIST

## 2019-05-07 PROCEDURE — 85018 HEMOGLOBIN: CPT | Performed by: ORTHOPAEDIC SURGERY

## 2019-05-07 PROCEDURE — 97165 OT EVAL LOW COMPLEX 30 MIN: CPT | Mod: GO | Performed by: OCCUPATIONAL THERAPIST

## 2019-05-07 RX ORDER — ACETAMINOPHEN 500 MG
1000 TABLET ORAL EVERY 6 HOURS PRN
Qty: 100 TABLET | Refills: 0 | Status: SHIPPED | OUTPATIENT
Start: 2019-05-07 | End: 2024-05-24

## 2019-05-07 RX ORDER — AMOXICILLIN 250 MG
2 CAPSULE ORAL 2 TIMES DAILY PRN
Qty: 60 TABLET | Refills: 0 | Status: SHIPPED | OUTPATIENT
Start: 2019-05-07 | End: 2024-05-24

## 2019-05-07 RX ORDER — OXYCODONE HYDROCHLORIDE 5 MG/1
TABLET ORAL
Qty: 30 TABLET | Refills: 0 | Status: SHIPPED | OUTPATIENT
Start: 2019-05-07 | End: 2024-05-24

## 2019-05-07 RX ADMIN — ALLOPURINOL 100 MG: 100 TABLET ORAL at 08:52

## 2019-05-07 RX ADMIN — SENNOSIDES AND DOCUSATE SODIUM 1 TABLET: 8.6; 5 TABLET ORAL at 08:52

## 2019-05-07 RX ADMIN — Medication 1 CAPSULE: at 08:52

## 2019-05-07 RX ADMIN — KETOROLAC TROMETHAMINE 15 MG: 15 INJECTION, SOLUTION INTRAMUSCULAR; INTRAVENOUS at 08:52

## 2019-05-07 RX ADMIN — CEFAZOLIN SODIUM 2 G: 2 INJECTION, SOLUTION INTRAVENOUS at 08:51

## 2019-05-07 RX ADMIN — KETOROLAC TROMETHAMINE 15 MG: 15 INJECTION, SOLUTION INTRAMUSCULAR; INTRAVENOUS at 03:40

## 2019-05-07 RX ADMIN — VITAMIN D 1000 UNITS: 25 TAB ORAL at 08:52

## 2019-05-07 RX ADMIN — LATANOPROST 1 DROP: 50 SOLUTION/ DROPS OPHTHALMIC at 00:23

## 2019-05-07 RX ADMIN — ENOXAPARIN SODIUM 40 MG: 40 INJECTION SUBCUTANEOUS at 11:59

## 2019-05-07 RX ADMIN — Medication 1 CAPSULE: at 08:51

## 2019-05-07 RX ADMIN — ACETAMINOPHEN 975 MG: 325 TABLET, FILM COATED ORAL at 06:06

## 2019-05-07 RX ADMIN — METOPROLOL SUCCINATE 25 MG: 25 TABLET, EXTENDED RELEASE ORAL at 08:51

## 2019-05-07 RX ADMIN — LOSARTAN POTASSIUM 100 MG: 100 TABLET, FILM COATED ORAL at 08:51

## 2019-05-07 ASSESSMENT — ACTIVITIES OF DAILY LIVING (ADL)
ADLS_ACUITY_SCORE: 10
ADLS_ACUITY_SCORE: 12

## 2019-05-07 NOTE — PLAN OF CARE
Discharge Planner PT   Patient plan for discharge: Home  Current status: Pt is 78 year old male adm on 5/6/19 for scheduled L THR and R hip pinning due to stress fractures. At baseline, pt is independent without use of assistive device, lives with spouse in two story home. PT evaluation completed and treatment initiated. Pt is mod I with bed mobility, mod I with sit to stand and bed to chair transfers. Pt amb 500' with FWW mod I. Pt up/down 3 stairs x2 trials with rail on R and SBA. Pt demonstrates independence with HEP, educated on activity recommendations as well as cryotherapy.  Barriers to return to prior living situation: None  Recommendations for discharge: Home  Rationale for recommendations: Pt is mobilizing well, able to complete all mobility necessary for discharge to home, do not anticipate further skilled PT needs at this time. Pt may benefit from OT evaluation/treatment to address ADLs prior to discharge to home.       Entered by: Julissa Richardson 05/07/2019 9:19 AM      Physical Therapy Discharge Summary    Reason for therapy discharge:    Discharged to home.  All goals and outcomes met, no further needs identified.    Progress towards therapy goal(s). See goals on Care Plan in [facility] electronic health record for goal details.  Goals met    Therapy recommendation(s):    No further therapy is recommended.

## 2019-05-07 NOTE — PLAN OF CARE
Patient A&Ox4, VSS on RA. Up with 1 & walker. Sanchez will be discharge & Be DTV CMS intact. Dressing CDI bilaterally. Regular diet. Pt progressing per plan of care.

## 2019-05-07 NOTE — PROGRESS NOTES
05/07/19 1000   Quick Adds   Type of Visit Initial Occupational Therapy Evaluation   Living Environment   Lives With spouse   Living Arrangements house   Home Accessibility stairs to enter home;stairs within home   Number of Stairs, Main Entrance 3   Number of Stairs, Within Home, Primary 10   Transportation Anticipated car, drives self;family or friend will provide   Living Environment Comment Pt lives with spouse in two story home;pt. has comfort height toilets(upper-level), grab bar, has RTS to use on main floor;pt. has a walk-in shower w/ grab bars, shower chair available   Self-Care   Usual Activity Tolerance good   Current Activity Tolerance good   Regular Exercise No   Equipment Currently Used at Home none;grab bar, toilet;grab bar, tub/shower;raised toilet;shower chair;walker, rolling  (RTS for main floor;has leg )   Activity/Exercise/Self-Care Comment Pt. typically indep. w/ I/ADl's. Pt.'s spouse will be able to assist at home;dtr. to stay w/ pt./spouse for a few days as well.   Functional Level   Ambulation 0-->independent   Transferring 0-->independent   Toileting 1-->assistive equipment   Bathing 1-->assistive equipment   Dressing 0-->independent   Eating 0-->independent   Communication 0-->understands/communicates without difficulty   Swallowing 0-->swallows foods/liquids without difficulty   Cognition 0 - no cognition issues reported   Fall history within last six months no   Which of the above functional risks had a recent onset or change? ambulation;transferring;toileting;bathing;dressing   General Information   Onset of Illness/Injury or Date of Surgery - Date 05/06/19   Referring Physician    Patient/Family Goals Statement Plans to DC home today   Additional Occupational Profile Info/Pertinent History of Current Problem OT:Pt is 78 year old male adm on 5/6/19 for scheduled hip surgery after L hip stress fracture found as part of work up for L groin pain x6 months. Pt also found to  have suspicious spot on R hip. On 5/6/19 pt underwent L hip THR and R hip pinning. PMH includes CAD with NSTEMI in 2014, HTN, and a-fib.   Precautions/Limitations fall precautions   Weight-Bearing Status - LLE weight-bearing as tolerated   Weight-Bearing Status - RLE weight-bearing as tolerated   General Observations Pt. up in bathroom upon arrival, agreeable to OT;pt.'s spouse and dtr. present.   General Info Comments Pt. typically indep. w/ IADL's at baseline.   Cognitive Status Examination   Orientation orientation to person, place and time   Level of Consciousness alert   Follows Commands (Cognition) WNL   Cognitive Comment appears intact/baseline   Visual Perception   Visual Perception Comments no vision problems noted/reported   Sensory Examination   Sensory Comments no numbness/tinglling reported   Pain Assessment   Patient Currently in Pain Yes, see Vital Sign flowsheet  (2-3/10--R hip, minimal L hip)   Range of Motion (ROM)   ROM Comment BUE WNL/WFL   Strength   Strength Comments BUE WNL/WFL   Hand Strength   Hand Strength Comments WNL   Mobility   Bed Mobility Comments NT   Transfer Skill: Sit to Stand   Level of [place]: Sit/Stand stand-by assist   Physical Assist/Nonphysical Assist: Sit/Stand verbal cues   Assistive Device for Transfer: Sit/Stand rolling walker   Toilet Transfer   Toilet Transfer Comments has RTS + comfort height toilets(upper-level) at home   Tub/Shower Transfer   Tub/Shower Transfer Comments walk-in shower/DME at home   Balance   Balance Comments good balance using WW/SBA/supervision   Toileting   Level of [place]: Toilet stand-by assist   Grooming   Level of [place]: Grooming stand-by assist   Instrumental Activities of Daily Living (IADL)   Previous Responsibilities   (spouse able to assist at home)   Activities of Daily Living Analysis   Impairments Contributing to Impaired Activities of Daily Living balance impaired;flexibility decreased;pain;ROM decreased;strength  "decreased  (decreased LE strength/ROM)   General Therapy Interventions   Planned Therapy Interventions ADL retraining   Clinical Impression   Criteria for Skilled Therapeutic Interventions Met yes, treatment indicated   OT Diagnosis Decline in ADL performance   Influenced by the following impairments pain, decreased flexibility, impaired balance, decreased LE strength/ROM   Assessment of Occupational Performance 3-5 Performance Deficits   Identified Performance Deficits Currently below baseline for dressing,bathing, toileting, fx. transfes, IADl's   Clinical Decision Making (Complexity) Low complexity   Therapy Frequency other (see comments)  (Eval. + 1 treatment session only)   Predicted Duration of Therapy Intervention (days/wks)   (Eval. + 1 treatment session only)   Anticipated Discharge Disposition Home;Home with Assist   Risks and Benefits of Treatment have been explained. Yes   Patient, Family & other staff in agreement with plan of care Yes   [facility] TM \"6 Clicks\"   2016, Trustees of [facility], under license to [employer].  All rights reserved.   6 Clicks Short Forms Daily Activity Inpatient Short Form   [facility]  \"6 Clicks\" Daily Activity Inpatient Short Form   1. Putting on and taking off regular lower body clothing? 3 - A Little   2. Bathing (including washing, rinsing, drying)? 3 - A Little   3. Toileting, which includes using toilet, bedpan or urinal? 3 - A Little   4. Putting on and taking off regular upper body clothing? 4 - None   5. Taking care of personal grooming such as brushing teeth? 4 - None   6. Eating meals? 4 - None   Daily Activity Raw Score (Score out of 24.Lower scores equate to lower levels of function) 21   Total Evaluation Time   Total Evaluation Time (Minutes) 12     "

## 2019-05-07 NOTE — PROGRESS NOTES
[facility]    Medicine Progress Note - Hospitalist Service       Date of Admission:  5/6/2019  Assessment & Plan      Chinmay Navas is a very pleasant 78-year-old male with a past medical history of coronary artery disease status post stents, hypertension, atrial fibrillation on Xarelto, and CHF, who underwent a planned left total hip arthroplasty on 05/06/2019.  The Hospitalist Service was consulted for postoperative medical co-management.     1.  Left hip stress fracture, status post left total hip arthroplasty, postoperative day #1.  The patient is doing well in this regard.  He has minimal pain.  Vital signs are otherwise stable.    - Defer routine postoperative cares to Orthopedic Surgery, Discharging today    2.  Coronary artery disease with history of NSTEMI and left circumflex stent in 2014, hypertension, history of CHF.  The patient was recently seen by his [facility] cardiologist, prior to surgery.  He had a myocardial perfusion study on 05/03/2019, that was normal with calculated EF of 56%.  An echocardiogram with EF of 49%, with asymmetric left ventricular hypertrophy and LV trabeculation.  He was cleared for surgery.  The patient denies any chest pain or anginal symptoms.    - We will resume prior to admission losartan and Toprol-XL with holding parameters.    - Restart on PTA hydrochlorothiazide on discharge  - Continue Zocor.    - Resume aspirin 1 week after surgery     3.  Atrial fibrillation.  The patient reports having history of an ablation and believes he has been in sinus rhythm since then.  He did have a Biopatch performed in 04/2019, that showed chronic left bundle branch block with 9 supraventricular tachycardic episodes, the longest lasting 8 beats and the fastest lasting 5 beats in duration, and there is no sustained atrial fibrillation.    - The patient and family had considered coming off of Xarelto, but are going to follow up with Cardiology.  His Xarelto has been held for a  week prior to surgery.    - plan for Lovenox x 2 days and Xarelto 10 mg daily x 4 weeks, then increase to pre-admit dose of 20 mg daily. Reconfirmed with family who are okay with this plan    4.  History of gout.  Continue with prior to admission allopurinol.     5.  DVT prophylaxis.  Lovenox x 2 days, then half dose of Xarelto for a month as stated above, followed by return to full dose Xarelto thereafter.      6.  Dispo: Medically stable for discharge.     }     Diet: Advance Diet as Tolerated: Regular Diet Adult  Diet    DVT Prophylaxis: Enoxaparin (Lovenox) SQ  Sanchez Catheter: not present  Code Status: Full Code      Disposition Plan   Expected discharge: Today, recommended to prior living arrangement once Met.  Entered: Cabrera Morrell MD 05/07/2019, 11:34 AM       The patient's care was discussed with the Patient and Patient's Family.    Cabrera Morrell MD  Hospitalist Service  [facility]    ______________________________________________________________________    Interval History   Px doing well.  No chest pain.  No SOB.  No abdominal pain.  Flatus this morning.  Ready for discharge.    Data reviewed today: I reviewed all medications, new labs and imaging results over the last 24 hours. I personally reviewed no images or EKG's today.    Physical Exam   Vital Signs: Temp: 98.8  F (37.1  C) Temp src: Oral BP: 130/67 Pulse: 67 Heart Rate: 78 Resp: 17 SpO2: 94 % O2 Device: None (Room air) Oxygen Delivery: 1 LPM    GENERAL:  The patient is alert, oriented to person, place and situation, cooperative, lying in bed, no apparent distress.   EYES:  Pupils equal and round.  Extraocular movements grossly intact.     HEENT:  Head normocephalic.  Throat, lips, mucosa and tongue appear moist.   NECK:  Supple.   CARDIOVASCULAR:  Heart regular rate and rhythm.  No murmurs, rubs or gallops.  Distal pulses are intact.   PULMONARY:  Lungs are clear to auscultation bilaterally.  No crackles, wheezes or  rhonchi.  Breathing is nonlabored.   GASTROINTESTINAL:  Abdomen soft, nontender, nondistended with normoactive bowel sounds.   MUSCULOSKELETAL:  The patient moves all 4 extremities equally.  Strength intact.   NEUROLOGIC:  Alert, cranial nerves II-XII are grossly intact.  Motor function is grossly intact.  No overt focal deficits.   SKIN:  Warm, dry, no rashes seen on exposed areas.   PSYCHIATRIC:  Normal mood and affect.         Data   Recent Labs   Lab 05/07/19  0718 05/06/19  1044   HGB 11.8* 14.3   PLT  --  220     --    POTASSIUM 3.8 3.6   CHLORIDE 106  --    CO2 28  --    BUN 25  --    CR 1.26* 1.12   ANIONGAP 6  --    WILLY 8.9  --    *  --      Recent Results (from the past 24 hour(s))   XR Surgery NAREN L/T 5 Min Fluoro w Stills    Narrative    SURGERY C-ARM FLUOROSCOPY LESS THAN 5 MINUTES WITH STILLS 5/6/2019  4:25 PM     COMPARISON: None.    HISTORY: Left direct anterior hip. Right hip cannulated screws.     NUMBER OF IMAGES ACQUIRED: 19    VIEWS: AP and lateral    FLUOROSCOPY TIME: 3.2      Impression    IMPRESSION: Intraoperative images during placement of three screws  across the right femoral neck and left total hip replacement.     DANICA ALONSO MD   XR Pelvis w Hip Port Bilateral    Narrative    XR PORTABLE PELVIS AND BILATERAL HIP TWO VIEWS   5/6/2019 6:13 PM     HISTORY: Status post total hip arthroplasty. Left anterior and right  cannulated screws.    COMPARISON: None.      Impression    IMPRESSION: There are 2 screws across the right femoral neck and a  left total hip replacement. No evidence of acute fracture or  dislocation. Prostate seeds are noted.    DANICA ALONSO MD

## 2019-05-07 NOTE — PLAN OF CARE
Paperwork and medications reviewed with pt. No further questions. Lovenox teaching and xarelto instruction given, pt understands. Iv d/c'd. Sent home with all belongings. Discharged home at 1250.

## 2019-05-07 NOTE — ANESTHESIA POSTPROCEDURE EVALUATION
Patient: Chinmay Navas    Procedure(s):  DIRECT ANTERIOR LEFT TOTAL HIP ARTHROPLASTY AND RIGHT HIP NAILINIG WITH ACE CANNULATED SCREWS  RIGHT HIP NAILING WITH ACE CANNULATED SCREWS    Diagnosis:LEFT HIP FRACTURE; RIGHT HIP FRACTURE  Diagnosis Additional Information: No value filed.    Anesthesia Type:  General, ETT    Note:  Anesthesia Post Evaluation    Patient location during evaluation: PACU  Patient participation: Able to fully participate in evaluation  Level of consciousness: awake and alert  Pain management: satisfactory to patient  Airway patency: patent  Cardiovascular status: hemodynamically stable  Respiratory status: acceptable and unassisted  Hydration status: balanced  PONV: none     Anesthetic complications: None          Last vitals:  Vitals:    05/06/19 1840 05/06/19 1850 05/06/19 1854   BP: 109/68     Pulse: 69     Resp: 12 11    Temp: 36  C (96.8  F) 36.1  C (97  F)    SpO2: 96% 96% 96%         Electronically Signed By: Moises Weber MD  May 6, 2019  7:00 PM

## 2019-05-07 NOTE — PLAN OF CARE
Discharge Planner OT     OT:Evaluation/treatment completed. Pt is 78 year old male adm on 5/6/19 for scheduled hip surgery after L hip stress fracture found as part of work up for L groin pain x6 months. Pt also found to have suspicious spot on R hip. On 5/6/19 pt underwent L hip THR and R hip pinning. PMH includes CAD with NSTEMI in 2014, HTN, and a-fib. Pt. resides w/ spouse, who will be able to assist at home. Pt.'s dtr. will also stay for a few days. Pt./spouse reports they have a RTS for main floor, comfort height toilets w/ grab br, walk-in shower w/ grab bars, shower chair available. Pt. typically indep. W/ I/ADl's.   Patient plan for discharge: Home today  Current status: Pt.doing well, overall SBA/supervision for room mobility using WW;completed toileting w/SBA, toilet transfer w/ supervision/mod. Indep.;pt. tolerated standing at sink for up to 10 min.to complete grooming/hyg. tasks, supervision only, good standing tolerance/balance. Instructed in safe shower transfer, verbalized understading, spouse to assist(will sponge-bathe X 1 week). Pt. has DME for shower. Pt. sat EOB to complete dressing tasks--able to libby underwear/pants indep./libby shoes indep., spouse to assist w/compression hose. Ed. in WW/home environmental safety--verb.understanding. Overall,doing well, goals met.  Barriers to return to prior living situation:None  Recommendations for discharge: Home/spouse assist as needed  Rationale for recommendations: Pt. doing well, has met goals. No further skilled OT needs.    Occupational Therapy Discharge Summary    Reason for therapy discharge:    All goals and outcomes met, no further needs identified.    Progress towards therapy goal(s). See goals on Care Plan in [facility] electronic health record for goal details.  Goals met    Therapy recommendation(s):    No further therapy is recommended.           Entered by: Zulema Hong 05/07/2019 1:23 PM

## 2019-05-07 NOTE — CONSULTS
Consult Date:  05/06/2019      PRIMARY CARE PROVIDER:  Enma Michael MD      REQUESTING PHYSICIAN:  Dr. Botello, of Orthopedic Surgery.      REASON FOR CONSULTATION:  Postoperative medical co-management of issues including coronary artery disease, hypertension and atrial fibrillation.      HISTORY OF PRESENT ILLNESS:  Chinmay Navas is a pleasant 78-year-old male with a past medical history of coronary artery disease, hypertension, atrial fibrillation on Xarelto, and prostate cancer, who underwent a planned left total hip arthroplasty on 05/06/2019.  This was performed to treat a stress fracture.  The procedure was performed by Dr. Botello under general anesthesia and was without any immediate postoperative complications.  Estimated blood loss was 300 mL.  The patient was given Ancef perioperatively for surgical prophylaxis.      The patient is resting comfortably in bed on my arrival.  He denies any pain currently.  Of note, the patient was seen by his cardiologist at [facility], prior to surgery.  He does have a history of NSTEMI in 2014, with stent to the left circumflex and follows with Dr. Bassett.  [facility] where his Biopatch had been placed that demonstrated chronic left bundle branch block with 9 supraventricular tachycardia episodes, the longest lasting 8 beats and the fastest lasting 5 beats in duration.  No sustained atrial fibrillation was identified on the monitor.  He does have a history of an ablation and has reportedly remained in sinus rhythm to his knowledge.  Prior to surgery, he had a myocardial perfusion study that was normal with calculated EF of 56%.  He also had an echocardiogram with EF of 49% with asymmetric left ventricular hypertrophy and LV trabeculation.  He was cleared for surgery by Cardiology.  The patient denies any anginal symptoms.  He denies fevers, chills, headache, lightheadedness, chest pain, shortness of breath, abdominal  pain, nausea, vomiting, diarrhea, dysuria or focal weakness.  His Xarelto was stopped 1 week prior to surgery.  He otherwise voices no concerns at this time.      PAST MEDICAL HISTORY:   1.  NSTEMI in 2014, status post a left circumflex stent.   2.  GERD.   3.  Hypertension.   4.  Atrial fibrillation, status post ablation.   5.  Chronic anticoagulation on Xarelto.   6.  Coronary artery disease.   7.  Hyperlipidemia.   8.  History of flash pulmonary edema.   9.  Macular degeneration.   10.  History of diastolic CHF.   11.  Prostate cancer.      PAST SURGICAL HISTORY:    Past Surgical History:   Procedure Laterality Date     ARTHROPLASTY HIP ANTERIOR Left 5/6/2019    Procedure: DIRECT ANTERIOR LEFT TOTAL HIP ARTHROPLASTY AND RIGHT HIP NAILINIG WITH ACE CANNULATED SCREWS;  Surgeon: Demetrius Hernandes MD;  Location:  OR     CARDIAC SURGERY      ablation for a fib     CARDIAC SURGERY      heart stent X 1     GENITOURINARY SURGERY      brachytherapy     OPEN REDUCTION INTERNAL FIXATION HIP Right 5/6/2019    Procedure: RIGHT HIP NAILING WITH ACE CANNULATED SCREWS;  Surgeon: Demetrius Hernandes MD;  Location:  OR     ORTHOPEDIC SURGERY      right shoulder replacement       FAMILY HISTORY:  This was reviewed and noncontributory to this presentation.      SOCIAL HISTORY:  The patient is a nonsmoker.  He drinks alcohol, typically only 1 beverage at a sitting, a couple times a week.  No illicit drug use.      PRIOR TO ADMISSION MEDICATIONS:    Prior to Admission medications    Medication Sig Last Dose Taking? Auth Provider   allopurinol (ZYLOPRIM) 100 MG tablet Take 100 mg by mouth 2 times daily 5/5/2019 at PM Yes Reported, Patient   Alpha-Lipoic Acid 300 MG TABS Take 300 mg by mouth daily 4/26/2019 at AM Yes Reported, Patient   hydrochlorothiazide (HYDRODIURIL) 25 MG tablet Take 25 mg by mouth daily 5/5/2019 at AM Yes Reported, Patient   LACTOBACILLUS PO Take 1 capsule by mouth daily  4/26/2019 at AM Yes Reported, Patient    latanoprost (XALATAN) 0.005 % ophthalmic solution Place 1 drop into both eyes At Bedtime  5/5/2019 at HS Yes Reported, Patient   losartan (COZAAR) 100 MG tablet Take 100 mg by mouth daily 5/5/2019 at AM Yes Reported, Patient   metoprolol succinate ER (TOPROL-XL) 25 MG 24 hr tablet Take 25 mg by mouth daily 5/6/2019 at 0700 Yes Reported, Patient   Multiple Vitamins-Minerals (VITEYES AREDS FORMULA/LUTEIN PO) Take 1 capsule by mouth 2 times daily 4/26/2019 at PM Yes Reported, Patient   nitroGLYcerin (NITROSTAT) 0.4 MG sublingual tablet Place 0.4 mg under the tongue every 5 minutes as needed for chest pain For chest pain place 1 tablet under the tongue every 5 minutes for 3 doses. If symptoms persist 5 minutes after 1st dose call 911. More than a year at PRN Yes Reported, Patient   simvastatin (ZOCOR) 40 MG tablet Take 40 mg by mouth At Bedtime 5/5/2019 at PM Yes Reported, Patient   tamsulosin (FLOMAX) 0.4 MG capsule Take 0.4 mg by mouth At Bedtime  5/5/2019 at PM Yes Reported, Patient   vitamin B-12 (CYANOCOBALAMIN) 2500 MCG sublingual tablet Take 2,500 mcg by mouth daily 4/26/2019 at AM Yes Reported, Patient   Vitamin D, Cholecalciferol, 1000 units CAPS Take 1,000 Units by mouth daily 4/26/2019 at AM Yes Reported, Patient         ALLERGIES:  ATORVASTATIN AND ADHESIVE TAPE.      REVIEW OF SYSTEMS:  A complete 10-point review of systems was performed and is negative, other than the items previously mentioned in the above HPI.      PHYSICAL EXAMINATION:   VITAL SIGNS:  Blood pressure 131/73, heart rate 64 beats per minute, temperature 97.6, respiratory rate 14.  Oxygen saturation 95% on 1 liter of oxygen by nasal cannula.   GENERAL:  The patient is alert, oriented to person, place and situation, cooperative, lying in bed, no apparent distress.   EYES:  Pupils equal and round.  Extraocular movements grossly intact.     HEENT:  Head normocephalic.  Throat, lips, mucosa and tongue appear moist.   NECK:  Supple.    CARDIOVASCULAR:  Heart regular rate and rhythm.  No murmurs, rubs or gallops.  Distal pulses are intact.   PULMONARY:  Lungs are clear to auscultation bilaterally.  No crackles, wheezes or rhonchi.  Breathing is nonlabored.   GASTROINTESTINAL:  Abdomen soft, nontender, nondistended with normoactive bowel sounds.   MUSCULOSKELETAL:  The patient moves all 4 extremities equally.  Strength intact.   NEUROLOGIC:  Alert, cranial nerves II-XII are grossly intact.  Motor function is grossly intact.  No overt focal deficits.   SKIN:  Warm, dry, no rashes seen on exposed areas.   PSYCHIATRIC:  Normal mood and affect.      DIAGNOSTIC DATA:  Potassium 3.6, creatinine 1.12.  Hemoglobin 14.3, platelet count 220.      Per review of preoperative notes, the patient's recent myocardial perfusion study on 05/03/2019, was normal with calculated EF of 56%.  Echocardiogram from the same day shows EF of 49%, with asymmetric left ventricular hypertrophy and left ventricular trabeculation.      ASSESSMENT AND PLAN:  Chinmay Navas is a very pleasant 78-year-old male with a past medical history of coronary artery disease status post stents, hypertension, atrial fibrillation on Xarelto, and CHF, who underwent a planned left total hip arthroplasty on 05/06/2019.  The Hospitalist Service was consulted for postoperative medical co-management.     1.  Left hip stress fracture, status post left total hip arthroplasty, postoperative day #0.  The patient is doing well in this regard.  He denies any pain.  Vital signs are stable.  Defer routine postoperative cares to Orthopedic Surgery.     2.  Coronary artery disease with history of NSTEMI and left circumflex stent in 2014, hypertension, history of CHF.  The patient was recently seen by his [facility] cardiologist, prior to surgery.  He had a myocardial perfusion study on 05/03/2019, that was normal with calculated EF of 56%.  An echocardiogram with EF of 49%, with asymmetric left ventricular hypertrophy  and LV trabeculation.  He was cleared for surgery.  The patient denies any chest pain or anginal symptoms.  We will resume prior to admission losartan and Toprol-XL with holding parameters.  Hold hydrochlorothiazide and plan to resume pending stable blood pressure and serum creatinine.  Continue Zocor.  Resume aspirin when okay from Orthopedic Surgery.     3.  Atrial fibrillation.  The patient reports having history of an ablation and believes he has been in sinus rhythm since then.  He did have a Biopatch performed in 2019, that showed chronic left bundle branch block with 9 supraventricular tachycardic episodes, the longest lasting 8 beats and the fastest lasting 5 beats in duration, and there is no sustained atrial fibrillation.  The patient and family had considered him coming off of Xarelto, but they are going to follow up with Cardiology.  His Xarelto has been held for a week prior to surgery.  Per Dr. Botello's note, plan for Lovenox x 2 days and Xarelto 10 mg daily x 4 weeks, then increase to pre-admit dose of 20 mg daily.     4.  History of gout.  Continue with prior to admission allopurinol.     5.  DVT prophylaxis.  Lovenox x 2 days, then half dose of Xarelto for a month as stated above, followed by return to full dose Xarelto thereafter.      CODE STATUS:  FULL CODE.      The patient was discussed with Dr. Clark Coughlin of the [facility] Hospitalist Service.  He is in agreement with my assessment and plan of care.      The Hospitalist Service would like to thank Dr. Botello for consulting us.  We will continue to follow along.         CLARK COUGHLIN MD       As dictated by KAYLIN MARTINEZ PA-C            D: 2019   T: 2019   MT: NICKO      Name:     EMORY MCPHERSON   MRN:      [id number]        Account:       [id number]   :      1940           Consult Date:  2019      Document: [id number]       cc: Enma Michael MD

## 2019-05-07 NOTE — PROGRESS NOTES
05/07/19 0901   Quick Adds   Type of Visit Initial PT Evaluation   Living Environment   Lives With spouse   Living Arrangements house   Home Accessibility stairs to enter home;stairs within home   Number of Stairs, Main Entrance 3   Stair Railings, Main Entrance railings safe and in good condition   Number of Stairs, Within Home, Primary 10   Stair Railings, Within Home, Primary railings safe and in good condition;railing on right side (ascending)   Transportation Anticipated car, drives self;family or friend will provide   Living Environment Comment Pt lives with spouse in two story home   Self-Care   Usual Activity Tolerance good   Current Activity Tolerance good   Regular Exercise No   Equipment Currently Used at Home none   Activity/Exercise/Self-Care Comment Pt does not use assistive device at baseline but does have cane and FWW for use at home   Functional Level Prior   Ambulation 0-->independent   Transferring 0-->independent   Fall history within last six months no   Prior Functional Level Comment Pt reports being independent without use of assistive device   General Information   Onset of Illness/Injury or Date of Surgery - Date 05/06/19   Referring Physician Demetrius Hernandes MD   Patient/Family Goals Statement To go home as soon as possible   Pertinent History of Current Problem (include personal factors and/or comorbidities that impact the POC) Pt is 78 year old male adm on 5/6/19 for scheduled hip surgery after L hip stress fracture found as part of work up for L groin pain x6 months. Pt also found to have suspicious spot on R hip. On 5/6/19 pt underwent L hip THR and R hip pinning. PMH includes CAD with NSTEMI in 2014, HTN, and a-fib.   Precautions/Limitations fall precautions   Weight-Bearing Status - LLE weight-bearing as tolerated   Weight-Bearing Status - RLE weight-bearing as tolerated   General Info Comments Activity: Ambulate   Cognitive Status Examination   Orientation orientation to person,  "place and time   Level of Consciousness alert   Pain Assessment   Patient Currently in Pain   (3/10 L hip  pain, 4/10 R hip pain)   Posture    Posture Forward head position;Protracted shoulders   Range of Motion (ROM)   ROM Comment B LE ROM WFL   Strength   Strength Comments B LE strength 3/5, can do SLR and LAQ B LE   Bed Mobility   Bed Mobility Comments SBA   Transfer Skills   Transfer Comments SBA   Gait   Gait Comments Amb 50' with FWW and CGA progressing to SBA   Balance   Balance Comments Good with UE support on FWW   Sensory Examination   Sensory Perception Comments Denies numbness or tingling   Coordination   Coordination no deficits were identified   Modality Interventions   Planned Modality Interventions Cryotherapy   General Therapy Interventions   Planned Therapy Interventions balance training;bed mobility training;gait training;ROM;strengthening;transfer training;home program guidelines;progressive activity/exercise   Clinical Impression   Criteria for Skilled Therapeutic Intervention yes, treatment indicated   PT Diagnosis Impaired gait   Influenced by the following impairments Decreased strength, decreased activity tolerance, increased pain   Functional limitations due to impairments Decreased ability to participate in daily tasks   Clinical Presentation Stable/Uncomplicated   Clinical Presentation Rationale Current presentation, [facility]   Clinical Decision Making (Complexity) Low complexity   Therapy Frequency` 2 times/day   Predicted Duration of Therapy Intervention (days/wks) 2 days   Anticipated Discharge Disposition Home   Risk & Benefits of therapy have been explained Yes   Patient, Family & other staff in agreement with plan of care Yes   Clinical Impression Comments Pt is mobilizing well, anticipate discharge to home with family, do not anticipate continued skilled PT needs after discharge from hospital at this time.   [facility] AM-PAC TM \"6 Clicks\"   2016, Trustees of [facility], " "under license to [employer].  All rights reserved.   6 Clicks Short Forms Basic Mobility Inpatient Short Form   [facility] AM-PAC  \"6 Clicks\" V.2 Basic Mobility Inpatient Short Form   1. Turning from your back to your side while in a flat bed without using bedrails? 4 - None   2. Moving from lying on your back to sitting on the side of a flat bed without using bedrails? 4 - None   3. Moving to and from a bed to a chair (including a wheelchair)? 4 - None   4. Standing up from a chair using your arms (e.g., wheelchair, or bedside chair)? 4 - None   5. To walk in hospital room? 3 - A Little   6. Climbing 3-5 steps with a railing? 3 - A Little   Basic Mobility Raw Score (Score out of 24.Lower scores equate to lower levels of function) 22   Total Evaluation Time   Total Evaluation Time (Minutes) 10     "

## 2019-05-07 NOTE — PLAN OF CARE
A&Ox4. Up Ax1 ambulated halls around nursing station. Pain managed with scheduled toradol and tylenol. Capno WNL. VSS on RA- may need 1L for NOC. Daniel patent with AUO. Tolerating full liquid diet. BS hypoactive. CMS intact. Dressing CDI.

## 2019-05-08 NOTE — DISCHARGE SUMMARY
"Discharge Summary    Chinmay Navas MRN# [id number]   YOB: 1940 Age: 78 year old     Date of Admission: 5/6/2019    Date of Discharge: 5/7/2019    Reason for Admission: Cihnmay Navas is an 78 year old male who was admitted to the hospital following surgery.    Preoperative Diagnosis: LEFT HIP FRACTURE; RIGHT HIP FRACTURE    Postoperative Diagnosis: LEFT HIP FRACTURE; RIGHT HIP FRACTURE    Procedure Completed:  Left anterior total hip arthroplasty; Surgical fixation right hip stress fracture with cannulated screws    Hospital Course:  Mr. Navas was admitted and underwent the above procedure. The patient tolerated the procedure well. There were no complications. Following surgery he was admitted to the floor.  During his stay he did not require any blood transfusions. His pain was controlled with oral pain medication.  During his stay he progressed well in physical therapy and all the therapy goals were met.     Discharge Physical Exam:  /67 (BP Location: Left arm)   Pulse 67   Temp 98.8  F (37.1  C) (Oral)   Resp 17   Ht 1.791 m (5' 10.5\")   Wt 91.2 kg (201 lb)   SpO2 94%   BMI 28.43 kg/m    Neurovascularly intact, distal pulses present bilaterally.  Calves are negative bilaterally, both soft and nontender.    Assessment: Mr. Navas is stable and doing well status post Left anterior total hip arthroplasty; Surgical fixation right hip stress fracture with cannulated screws.    Plan: We will discharge him home on analgesics and deep venous thrombosis prophylaxis.  He will follow-up with me approximately 2 weeks from surgery.  He may call [phone] to schedule an appointment.    Meds:   Chinmay Navas   Home Medication Instructions MARZENA:[id number]    Printed on:05/08/19 1112   Medication Information                      acetaminophen (TYLENOL) 500 MG tablet  Take 2 tablets (1,000 mg) by mouth every 6 hours as needed for pain             allopurinol (ZYLOPRIM) 100 MG " tablet  Take 100 mg by mouth 2 times daily             Alpha-Lipoic Acid 300 MG TABS  Take 300 mg by mouth daily             enoxaparin (LOVENOX) 40 MG/0.4ML syringe  Inject 0.4 mLs (40 mg) Subcutaneous daily for 1 day             hydrochlorothiazide (HYDRODIURIL) 25 MG tablet  Take 25 mg by mouth daily             LACTOBACILLUS PO  Take 1 capsule by mouth daily              latanoprost (XALATAN) 0.005 % ophthalmic solution  Place 1 drop into both eyes At Bedtime              losartan (COZAAR) 100 MG tablet  Take 100 mg by mouth daily             metoprolol succinate ER (TOPROL-XL) 25 MG 24 hr tablet  Take 25 mg by mouth daily             Multiple Vitamins-Minerals (VITEYES AREDS FORMULA/LUTEIN PO)  Take 1 capsule by mouth 2 times daily             nitroGLYcerin (NITROSTAT) 0.4 MG sublingual tablet  Place 0.4 mg under the tongue every 5 minutes as needed for chest pain For chest pain place 1 tablet under the tongue every 5 minutes for 3 doses. If symptoms persist 5 minutes after 1st dose call 911.             oxyCODONE (ROXICODONE) 5 MG tablet  Take 1 tablet every 4-6 hours for pain rating 3-6,  Take 2 tablets every 4-6 hours for pain 7-10    **Max 6 tablets per day**             rivaroxaban ANTICOAGULANT (XARELTO) 10 MG TABS tablet  Take 1 tablet (10 mg) by mouth daily (with dinner)             senna-docusate (SENOKOT-S/PERICOLACE) 8.6-50 MG tablet  Take 2 tablets by mouth 2 times daily as needed for constipation             simvastatin (ZOCOR) 40 MG tablet  Take 40 mg by mouth At Bedtime             tamsulosin (FLOMAX) 0.4 MG capsule  Take 0.4 mg by mouth At Bedtime              vitamin B-12 (CYANOCOBALAMIN) 2500 MCG sublingual tablet  Take 2,500 mcg by mouth daily             Vitamin D, Cholecalciferol, 1000 units CAPS  Take 1,000 Units by mouth daily

## 2019-05-13 LAB
COPATH REPORT: NORMAL
INTERPRETATION ECG - MUSE: NORMAL

## 2019-05-19 NOTE — OP NOTE
DATE OF SERVICE:  5/6/2019    SURGEON  MARSHALL MEDINA M.D.    ASSISTANT  Winter Alan PA-C    PREOPERATIVE DIAGNOSIS   1.  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  2.  Right Femoral neck fracture    POSTOPERATIVE DIAGNOSIS:    Same       TITLE OF PROCEDURE   1.  Left total hip arthroplasty, Depuy uncemented components, direct anterior approach.   2.  Right hip percutaneous pinning with Ace Cannulated screws.    PROCEDURE  The patient was brought to the operating room and after satisfactory anesthesia was placed on the [place] table.    The right hip was addressed first.  [place] table was adjusted in such a way that the left leg was extended, and the right hip could be visualized with image intensification on  both AP and lateral views.  The right hip was then prepped and draped in the usual sterile fashion.  Image intensification was utilized throughout the case for pin placement and screw placement.  A small incision was made over the lateral aspect of the femur.  Dissection was carried down to the femur through the fascia and vastus lateralis.  3 separate guidepins were placed in a triangular fashion, with 2 pins placed superiorly, one pin placed inferiorly, taking care only to place one pin tract for each of the screws.  The inferior pin was kept above the lesser trochanter.  These were placed in parallel fashion, seen on both AP and lateral views.    Measurements were performed, and 3 Ace 6.5mm cannulated screws were placed.  Image intensification confirmed satisfactory placement of the screws, and they gave excellent fixation.  The wound was then irrigated, and closed with closure of the fascia, subcutaneous layer, subcuticular layer, and Dermabond Prineol.  A sterile dressing was applied.    Attention was then directed to the left hip.  Antibiotics were re-dosed.     The left  lower extremity was then prepped and draped in the usual sterile fashion. Image  intensification was utilized during the case for component positioning as well as leg length assessment. An incision was made just lateral to the ASIS and coursing toward the proximal femur. Dissection was carried down to the TFL. The fascia overlying the TFL was incised and dissection was carried down to the interval between TFL and rectus femoris. This was identified. A lateral cobra retractor was placed followed by a medial cobra around the femoral neck. The leash vessels, anterior circumflex, was identified and was coagulated. The underlying fascia was released. Dissection was carried down to the hip capsule. Capsule was identified and a capsulotomy was performed in a T-type fashion first along the intertrochanteric line and then secondarily up along the femoral neck and head, finally completed by releasing along the saddle of the trochanter. The capsular edges were tagged for later repair. The hip was then externally rotated and medial capsular release was performed such that the lesser trochanter could be palpated. Following this, the femoral neck was osteotomized as per the preoperative plan.   There was thickening and sclerosis noted about the femoral neck, consistent with the non union of the femoral neck stress fracture.  The femoral head was removed with a corkscrew without difficulty.   Femoral head was sent to pathology for examination.  The acetabulum was exposed and was reamed sequentially up to 58 mm. This was reamed under both direct visualization as well as the aid of image intensification. A 58 mm [place] cup was impacted into place in approximately 40 to 45 degrees of abduction, and 15 degrees of anteversion. Two screws were placed and this gave excellent fixation. The 36 mm +4mm lateralized neutral liner was impacted into place.     Attention was then directed to the femur. The hook was placed underneath the proximal aspect of the femur between the trochanteric ridge and gluteus tiffany. The  hip was then brought into external rotation, adduction, and extension. The femur was then carefully elevated using the appropriate releases off the inside of the greater trochanter. Once the femur was elevated, a starter broach was placed followed by sequential broaches. The broaches were performed up to size 6 Actis, which gave excellent torsinal as well as axial stability. Trial reduction was performed with a standard offset,  +8.5mm head. The hip was reduced and with hip reduction the combined anteversion looked excellent. The hip was brought into full extension and external rotation. There was no evidence of instability. As well, x-rays were printed and compared with the opposite side and found to have good length and restoration of offset.  It was felt that an additional stem size could not be placed.  The hip was then dislocated and then the proximal femur was then brought back up into the proximal aspect of the wound. The real size 6 Actis stem, standard offset,  was impacted into place. Again this gave excellent torsion as well as axial stability. The real +8.5mm ceramic biolox head was impacted into place and the hip was reduced again. The image intensification confirmed excellent position of the components.   A 3 minute dilute betadine soak was performed.  The wound was thoroughly irrigated. The capsule was closed with interrupted 0 Vicryl suture and tissues infiltrated with toradol/marcaine mixture. The tensor fascia was closed with a running 0 Stratafix suture. The subcutaneous layer was closed with interrupted 2-0 Vicryl, 2-0 Stratafix, and 3-0 subcuticular monocryl was placed followed by Dermabond Prineo. One gram of vancomycin powder was placed deep and superficial prior to closure.  Sterile dressing was applied. The patient left the operating room in satisfactory condition. Patient received 1 gm of tranexamic acid pre-op and at closure.

## 2024-04-22 ENCOUNTER — MEDICAL CORRESPONDENCE (OUTPATIENT)
Dept: HEALTH INFORMATION MANAGEMENT | Facility: CLINIC | Age: 84
End: 2024-04-22

## 2024-05-24 RX ORDER — AZITHROMYCIN 250 MG/1
TABLET, FILM COATED ORAL
Status: ON HOLD | COMMUNITY
End: 2024-05-29

## 2024-05-24 RX ORDER — AMOXICILLIN 500 MG/1
2000 CAPSULE ORAL
COMMUNITY

## 2024-05-24 RX ORDER — OFLOXACIN 3 MG/ML
5 SOLUTION AURICULAR (OTIC) DAILY
COMMUNITY

## 2024-05-24 NOTE — PROGRESS NOTES
PTA medications updated by Medication Scribe prior to surgery via phone call with patient (last doses completed by Nurse)     Medication history sources: Patient and H&P  In the past week, patient estimated taking medication this percent of the time: Greater than 90%      Significant changes made to the medication list:  Patient reports no longer taking the following meds (med scribe removed from PTA med list): Tylenol, alpha lipoic acid, nitroglycerin, Senna-S, vitamin B-12, vitamin D      Additional medication history information:   Patient was advised to bring: Latanoprost eyes drops    Medication reconciliation completed by provider prior to medication history? No    Time spent in this activity: 40 minutes    The information provided in this note is only as accurate as the sources available at the time of update(s)      Prior to Admission medications    Medication Sig Last Dose Taking? Auth Provider Long Term End Date   allopurinol (ZYLOPRIM) 100 MG tablet Take 100 mg by mouth 2 times daily  at am Yes Reported, Patient     amoxicillin (AMOXIL) 500 MG capsule Take 2,000 mg by mouth once as needed (for dental appointments) ([id number]) Unknown at PRN Yes Reported, Patient     azithromycin (ZITHROMAX) 250 MG tablet 500mg ([id number]) day 1 then 250mg for days 2-5  at am Yes Reported, Patient     hydrochlorothiazide (HYDRODIURIL) 25 MG tablet Take 25 mg by mouth daily  at am Yes Reported, Patient Yes    LACTOBACILLUS PO Take 1 capsule by mouth daily   at am Yes Reported, Patient     latanoprost (XALATAN) 0.005 % ophthalmic solution Place 1 drop into both eyes At Bedtime   at pm Yes Reported, Patient Yes    losartan (COZAAR) 100 MG tablet Take 100 mg by mouth daily  at am Yes Reported, Patient Yes    MAGNESIUM GLYCINATE PO Take 240 mg by mouth every evening  at pm Yes Reported, Patient     metoprolol succinate ER (TOPROL-XL) 25 MG 24 hr tablet Take 25 mg by mouth daily  at am Yes Reported, Patient Yes     Multiple Vitamins-Minerals (VITEYES AREDS FORMULA/LUTEIN PO) Take 1 capsule by mouth 2 times daily  at am Yes Reported, Patient     ofloxacin (FLOXIN) 0.3 % otic solution Place 5 drops into both ears daily For 5 days  at am Yes Reported, Patient     rivaroxaban ANTICOAGULANT (XARELTO) 10 MG TABS tablet Take 1 tablet (10 mg) by mouth daily (with dinner)  at pm Yes Winter Alan PA-C Yes    simvastatin (ZOCOR) 40 MG tablet Take 40 mg by mouth At Bedtime  at pm Yes Reported, Patient Yes    tamsulosin (FLOMAX) 0.4 MG capsule Take 0.4 mg by mouth At Bedtime   at pm Yes Reported, Patient             Medication history completed by: Alf Noland

## 2024-05-29 ENCOUNTER — HOSPITAL ENCOUNTER (OUTPATIENT)
Facility: CLINIC | Age: 84
Discharge: HOME | End: 2024-05-30
Attending: ORTHOPAEDIC SURGERY | Admitting: ORTHOPAEDIC SURGERY
Payer: COMMERCIAL

## 2024-05-29 ENCOUNTER — ANESTHESIA EVENT (OUTPATIENT)
Dept: SURGERY | Facility: CLINIC | Age: 84
End: 2024-05-29
Payer: COMMERCIAL

## 2024-05-29 ENCOUNTER — APPOINTMENT (OUTPATIENT)
Dept: GENERAL RADIOLOGY | Facility: CLINIC | Age: 84
End: 2024-05-29
Attending: ORTHOPAEDIC SURGERY
Payer: COMMERCIAL

## 2024-05-29 ENCOUNTER — ANESTHESIA (OUTPATIENT)
Dept: SURGERY | Facility: CLINIC | Age: 84
End: 2024-05-29
Payer: COMMERCIAL

## 2024-05-29 PROBLEM — M12.812 LEFT ROTATOR CUFF TEAR ARTHROPATHY: Status: ACTIVE | Noted: 2024-05-29

## 2024-05-29 PROBLEM — M75.102 LEFT ROTATOR CUFF TEAR ARTHROPATHY: Status: ACTIVE | Noted: 2024-05-29

## 2024-05-29 LAB
CREAT SERPL-MCNC: 1.14 MG/DL (ref 0.67–1.17)
EGFRCR SERPLBLD CKD-EPI 2021: 63 ML/MIN/1.73M2
FASTING STATUS PATIENT QL REPORTED: YES
GLUCOSE SERPL-MCNC: 101 MG/DL (ref 70–99)
POTASSIUM SERPL-SCNC: 3.8 MMOL/L (ref 3.4–5.3)

## 2024-05-29 PROCEDURE — 250N000011 HC RX IP 250 OP 636: Performed by: ORTHOPAEDIC SURGERY

## 2024-05-29 PROCEDURE — 250N000011 HC RX IP 250 OP 636: Performed by: PHYSICIAN ASSISTANT

## 2024-05-29 PROCEDURE — 250N000011 HC RX IP 250 OP 636: Performed by: NURSE ANESTHETIST, CERTIFIED REGISTERED

## 2024-05-29 PROCEDURE — 999N000141 HC STATISTIC PRE-PROCEDURE NURSING ASSESSMENT: Performed by: ORTHOPAEDIC SURGERY

## 2024-05-29 PROCEDURE — 82565 ASSAY OF CREATININE: CPT | Performed by: ORTHOPAEDIC SURGERY

## 2024-05-29 PROCEDURE — 271N000001 HC OR GENERAL SUPPLY NON-STERILE: Performed by: ORTHOPAEDIC SURGERY

## 2024-05-29 PROCEDURE — 710N000009 HC RECOVERY PHASE 1, LEVEL 1, PER MIN: Performed by: ORTHOPAEDIC SURGERY

## 2024-05-29 PROCEDURE — 82947 ASSAY GLUCOSE BLOOD QUANT: CPT | Performed by: ANESTHESIOLOGY

## 2024-05-29 PROCEDURE — 258N000003 HC RX IP 258 OP 636: Performed by: ANESTHESIOLOGY

## 2024-05-29 PROCEDURE — C1713 ANCHOR/SCREW BN/BN,TIS/BN: HCPCS | Performed by: ORTHOPAEDIC SURGERY

## 2024-05-29 PROCEDURE — 23472 RECONSTRUCT SHOULDER JOINT: CPT | Performed by: NURSE ANESTHETIST, CERTIFIED REGISTERED

## 2024-05-29 PROCEDURE — 370N000017 HC ANESTHESIA TECHNICAL FEE, PER MIN: Performed by: ORTHOPAEDIC SURGERY

## 2024-05-29 PROCEDURE — 84132 ASSAY OF SERUM POTASSIUM: CPT | Performed by: ANESTHESIOLOGY

## 2024-05-29 PROCEDURE — 250N000009 HC RX 250: Performed by: PHYSICIAN ASSISTANT

## 2024-05-29 PROCEDURE — 36415 COLL VENOUS BLD VENIPUNCTURE: CPT | Performed by: ANESTHESIOLOGY

## 2024-05-29 PROCEDURE — 258N000001 HC RX 258: Performed by: ORTHOPAEDIC SURGERY

## 2024-05-29 PROCEDURE — 250N000009 HC RX 250: Performed by: NURSE ANESTHETIST, CERTIFIED REGISTERED

## 2024-05-29 PROCEDURE — 250N000013 HC RX MED GY IP 250 OP 250 PS 637: Performed by: ORTHOPAEDIC SURGERY

## 2024-05-29 PROCEDURE — 23472 RECONSTRUCT SHOULDER JOINT: CPT | Performed by: ANESTHESIOLOGY

## 2024-05-29 PROCEDURE — L3670 SO ACRO/CLAV CAN WEB PRE OTS: HCPCS

## 2024-05-29 PROCEDURE — 999N000065 XR SHOULDER LEFT PORT G/E 2 VIEWS: Mod: LT

## 2024-05-29 PROCEDURE — 272N000001 HC OR GENERAL SUPPLY STERILE: Performed by: ORTHOPAEDIC SURGERY

## 2024-05-29 PROCEDURE — 360N000077 HC SURGERY LEVEL 4, PER MIN: Performed by: ORTHOPAEDIC SURGERY

## 2024-05-29 PROCEDURE — 250N000025 HC SEVOFLURANE, PER MIN: Performed by: ORTHOPAEDIC SURGERY

## 2024-05-29 PROCEDURE — 250N000011 HC RX IP 250 OP 636: Performed by: ANESTHESIOLOGY

## 2024-05-29 PROCEDURE — C1776 JOINT DEVICE (IMPLANTABLE): HCPCS | Performed by: ORTHOPAEDIC SURGERY

## 2024-05-29 PROCEDURE — 99100 ANES PT EXTEME AGE<1 YR&>70: CPT | Performed by: NURSE ANESTHETIST, CERTIFIED REGISTERED

## 2024-05-29 DEVICE — IMP SCR FIXATION 4.5X20MM AQLS VDV220: Type: IMPLANTABLE DEVICE | Site: SHOULDER | Status: FUNCTIONAL

## 2024-05-29 DEVICE — IMPLANTABLE DEVICE
Type: IMPLANTABLE DEVICE | Site: SHOULDER | Status: FUNCTIONAL
Brand: AEQUALIS REVERSED II

## 2024-05-29 DEVICE — IMPLANTABLE DEVICE
Type: IMPLANTABLE DEVICE | Site: SHOULDER | Status: FUNCTIONAL
Brand: TORNIER FLEX SHOULDER SYSTEM

## 2024-05-29 DEVICE — IMP SCR LOCKING 4.5X29MM AQLS DWD029: Type: IMPLANTABLE DEVICE | Site: SHOULDER | Status: FUNCTIONAL

## 2024-05-29 DEVICE — IMP SCR LOCKING 4.5X32MM AQLS DWD032: Type: IMPLANTABLE DEVICE | Site: SHOULDER | Status: FUNCTIONAL

## 2024-05-29 RX ORDER — FENTANYL CITRATE 0.05 MG/ML
50 INJECTION, SOLUTION INTRAMUSCULAR; INTRAVENOUS EVERY 5 MIN PRN
Status: DISCONTINUED | OUTPATIENT
Start: 2024-05-29 | End: 2024-05-29 | Stop reason: HOSPADM

## 2024-05-29 RX ORDER — ALLOPURINOL 100 MG/1
100 TABLET ORAL 2 TIMES DAILY
Status: DISCONTINUED | OUTPATIENT
Start: 2024-05-29 | End: 2024-05-30 | Stop reason: HOSPADM

## 2024-05-29 RX ORDER — TRANEXAMIC ACID 10 MG/ML
1 INJECTION, SOLUTION INTRAVENOUS ONCE
Status: COMPLETED | OUTPATIENT
Start: 2024-05-29 | End: 2024-05-29

## 2024-05-29 RX ORDER — CEFAZOLIN SODIUM/WATER 2 G/20 ML
2 SYRINGE (ML) INTRAVENOUS
Status: COMPLETED | OUTPATIENT
Start: 2024-05-29 | End: 2024-05-29

## 2024-05-29 RX ORDER — LIDOCAINE 40 MG/G
CREAM TOPICAL
Status: DISCONTINUED | OUTPATIENT
Start: 2024-05-29 | End: 2024-05-30 | Stop reason: HOSPADM

## 2024-05-29 RX ORDER — HYDROMORPHONE HCL IN WATER/PF 6 MG/30 ML
0.2 PATIENT CONTROLLED ANALGESIA SYRINGE INTRAVENOUS EVERY 5 MIN PRN
Status: DISCONTINUED | OUTPATIENT
Start: 2024-05-29 | End: 2024-05-29 | Stop reason: HOSPADM

## 2024-05-29 RX ORDER — SODIUM CHLORIDE, SODIUM LACTATE, POTASSIUM CHLORIDE, CALCIUM CHLORIDE 600; 310; 30; 20 MG/100ML; MG/100ML; MG/100ML; MG/100ML
INJECTION, SOLUTION INTRAVENOUS CONTINUOUS
Status: DISCONTINUED | OUTPATIENT
Start: 2024-05-29 | End: 2024-05-29 | Stop reason: HOSPADM

## 2024-05-29 RX ORDER — HYDROMORPHONE HCL IN WATER/PF 6 MG/30 ML
0.4 PATIENT CONTROLLED ANALGESIA SYRINGE INTRAVENOUS
Status: DISCONTINUED | OUTPATIENT
Start: 2024-05-29 | End: 2024-05-30 | Stop reason: HOSPADM

## 2024-05-29 RX ORDER — LABETALOL HYDROCHLORIDE 5 MG/ML
10 INJECTION, SOLUTION INTRAVENOUS
Status: DISCONTINUED | OUTPATIENT
Start: 2024-05-29 | End: 2024-05-29 | Stop reason: HOSPADM

## 2024-05-29 RX ORDER — DEXAMETHASONE SODIUM PHOSPHATE 4 MG/ML
4 INJECTION, SOLUTION INTRA-ARTICULAR; INTRALESIONAL; INTRAMUSCULAR; INTRAVENOUS; SOFT TISSUE
Status: DISCONTINUED | OUTPATIENT
Start: 2024-05-29 | End: 2024-05-29 | Stop reason: HOSPADM

## 2024-05-29 RX ORDER — HYDROXYZINE HYDROCHLORIDE 10 MG/1
10 TABLET, FILM COATED ORAL EVERY 6 HOURS PRN
Qty: 30 TABLET | Refills: 0 | Status: SHIPPED | OUTPATIENT
Start: 2024-05-29

## 2024-05-29 RX ORDER — LIDOCAINE HYDROCHLORIDE 20 MG/ML
INJECTION, SOLUTION INFILTRATION; PERINEURAL PRN
Status: DISCONTINUED | OUTPATIENT
Start: 2024-05-29 | End: 2024-05-29

## 2024-05-29 RX ORDER — POLYETHYLENE GLYCOL 3350 17 G/17G
17 POWDER, FOR SOLUTION ORAL DAILY
Status: DISCONTINUED | OUTPATIENT
Start: 2024-05-30 | End: 2024-05-30 | Stop reason: HOSPADM

## 2024-05-29 RX ORDER — DIPHENHYDRAMINE HCL 12.5MG/5ML
12.5 LIQUID (ML) ORAL EVERY 6 HOURS PRN
Status: DISCONTINUED | OUTPATIENT
Start: 2024-05-29 | End: 2024-05-30 | Stop reason: HOSPADM

## 2024-05-29 RX ORDER — TAMSULOSIN HYDROCHLORIDE 0.4 MG/1
0.4 CAPSULE ORAL AT BEDTIME
Status: DISCONTINUED | OUTPATIENT
Start: 2024-05-29 | End: 2024-05-30 | Stop reason: HOSPADM

## 2024-05-29 RX ORDER — ONDANSETRON 4 MG/1
4 TABLET, ORALLY DISINTEGRATING ORAL EVERY 6 HOURS PRN
Status: DISCONTINUED | OUTPATIENT
Start: 2024-05-29 | End: 2024-05-30 | Stop reason: HOSPADM

## 2024-05-29 RX ORDER — SODIUM CHLORIDE, SODIUM LACTATE, POTASSIUM CHLORIDE, CALCIUM CHLORIDE 600; 310; 30; 20 MG/100ML; MG/100ML; MG/100ML; MG/100ML
INJECTION, SOLUTION INTRAVENOUS CONTINUOUS
Status: DISCONTINUED | OUTPATIENT
Start: 2024-05-29 | End: 2024-05-30 | Stop reason: HOSPADM

## 2024-05-29 RX ORDER — METOPROLOL SUCCINATE 25 MG/1
25 TABLET, EXTENDED RELEASE ORAL DAILY
Status: DISCONTINUED | OUTPATIENT
Start: 2024-05-30 | End: 2024-05-30 | Stop reason: HOSPADM

## 2024-05-29 RX ORDER — ONDANSETRON 4 MG/1
4 TABLET, ORALLY DISINTEGRATING ORAL EVERY 30 MIN PRN
Status: DISCONTINUED | OUTPATIENT
Start: 2024-05-29 | End: 2024-05-29 | Stop reason: HOSPADM

## 2024-05-29 RX ORDER — NALOXONE HYDROCHLORIDE 0.4 MG/ML
0.4 INJECTION, SOLUTION INTRAMUSCULAR; INTRAVENOUS; SUBCUTANEOUS
Status: DISCONTINUED | OUTPATIENT
Start: 2024-05-29 | End: 2024-05-30 | Stop reason: HOSPADM

## 2024-05-29 RX ORDER — KETOROLAC TROMETHAMINE 15 MG/ML
15 INJECTION, SOLUTION INTRAMUSCULAR; INTRAVENOUS EVERY 6 HOURS PRN
Status: DISCONTINUED | OUTPATIENT
Start: 2024-05-29 | End: 2024-05-30 | Stop reason: HOSPADM

## 2024-05-29 RX ORDER — SIMVASTATIN 40 MG
40 TABLET ORAL AT BEDTIME
Status: DISCONTINUED | OUTPATIENT
Start: 2024-05-29 | End: 2024-05-30 | Stop reason: HOSPADM

## 2024-05-29 RX ORDER — ACETAMINOPHEN 325 MG/1
650 TABLET ORAL EVERY 4 HOURS PRN
Status: DISCONTINUED | OUTPATIENT
Start: 2024-06-01 | End: 2024-05-30 | Stop reason: HOSPADM

## 2024-05-29 RX ORDER — PROPOFOL 10 MG/ML
INJECTION, EMULSION INTRAVENOUS PRN
Status: DISCONTINUED | OUTPATIENT
Start: 2024-05-29 | End: 2024-05-29

## 2024-05-29 RX ORDER — L. ACIDOPHILUS/L.BULGARICUS 1MM CELL
TABLET ORAL DAILY
Status: DISCONTINUED | OUTPATIENT
Start: 2024-05-29 | End: 2024-05-29

## 2024-05-29 RX ORDER — HYDROMORPHONE HCL IN WATER/PF 6 MG/30 ML
0.2 PATIENT CONTROLLED ANALGESIA SYRINGE INTRAVENOUS
Status: DISCONTINUED | OUTPATIENT
Start: 2024-05-29 | End: 2024-05-30 | Stop reason: HOSPADM

## 2024-05-29 RX ORDER — ONDANSETRON 2 MG/ML
4 INJECTION INTRAMUSCULAR; INTRAVENOUS EVERY 30 MIN PRN
Status: DISCONTINUED | OUTPATIENT
Start: 2024-05-29 | End: 2024-05-29 | Stop reason: HOSPADM

## 2024-05-29 RX ORDER — HYDROMORPHONE HCL IN WATER/PF 6 MG/30 ML
0.4 PATIENT CONTROLLED ANALGESIA SYRINGE INTRAVENOUS EVERY 5 MIN PRN
Status: DISCONTINUED | OUTPATIENT
Start: 2024-05-29 | End: 2024-05-29 | Stop reason: HOSPADM

## 2024-05-29 RX ORDER — OXYCODONE HYDROCHLORIDE 5 MG/1
5-10 TABLET ORAL EVERY 4 HOURS PRN
Qty: 30 TABLET | Refills: 0 | Status: SHIPPED | OUTPATIENT
Start: 2024-05-29

## 2024-05-29 RX ORDER — NALOXONE HYDROCHLORIDE 0.4 MG/ML
0.1 INJECTION, SOLUTION INTRAMUSCULAR; INTRAVENOUS; SUBCUTANEOUS
Status: DISCONTINUED | OUTPATIENT
Start: 2024-05-29 | End: 2024-05-29 | Stop reason: HOSPADM

## 2024-05-29 RX ORDER — HYDROXYZINE HYDROCHLORIDE 10 MG/1
10 TABLET, FILM COATED ORAL EVERY 6 HOURS PRN
Status: DISCONTINUED | OUTPATIENT
Start: 2024-05-29 | End: 2024-05-30 | Stop reason: HOSPADM

## 2024-05-29 RX ORDER — FENTANYL CITRATE 50 UG/ML
INJECTION, SOLUTION INTRAMUSCULAR; INTRAVENOUS PRN
Status: DISCONTINUED | OUTPATIENT
Start: 2024-05-29 | End: 2024-05-29

## 2024-05-29 RX ORDER — LATANOPROST 50 UG/ML
1 SOLUTION/ DROPS OPHTHALMIC AT BEDTIME
Status: DISCONTINUED | OUTPATIENT
Start: 2024-05-29 | End: 2024-05-30 | Stop reason: HOSPADM

## 2024-05-29 RX ORDER — EPHEDRINE SULFATE 50 MG/ML
INJECTION, SOLUTION INTRAMUSCULAR; INTRAVENOUS; SUBCUTANEOUS PRN
Status: DISCONTINUED | OUTPATIENT
Start: 2024-05-29 | End: 2024-05-29

## 2024-05-29 RX ORDER — AMOXICILLIN 250 MG
1 CAPSULE ORAL 2 TIMES DAILY
Status: DISCONTINUED | OUTPATIENT
Start: 2024-05-29 | End: 2024-05-30 | Stop reason: HOSPADM

## 2024-05-29 RX ORDER — OXYCODONE HYDROCHLORIDE 5 MG/1
10 TABLET ORAL
Status: DISCONTINUED | OUTPATIENT
Start: 2024-05-29 | End: 2024-05-30 | Stop reason: HOSPADM

## 2024-05-29 RX ORDER — LOSARTAN POTASSIUM 100 MG/1
100 TABLET ORAL DAILY
Status: DISCONTINUED | OUTPATIENT
Start: 2024-05-29 | End: 2024-05-30 | Stop reason: HOSPADM

## 2024-05-29 RX ORDER — HYDROCHLOROTHIAZIDE 25 MG/1
25 TABLET ORAL DAILY
Status: DISCONTINUED | OUTPATIENT
Start: 2024-05-30 | End: 2024-05-30 | Stop reason: HOSPADM

## 2024-05-29 RX ORDER — MAGNESIUM GLYCINATE 100 MG
240 CAPSULE ORAL EVERY EVENING
Status: DISCONTINUED | OUTPATIENT
Start: 2024-05-29 | End: 2024-05-29

## 2024-05-29 RX ORDER — FENTANYL CITRATE 0.05 MG/ML
25 INJECTION, SOLUTION INTRAMUSCULAR; INTRAVENOUS EVERY 5 MIN PRN
Status: DISCONTINUED | OUTPATIENT
Start: 2024-05-29 | End: 2024-05-29 | Stop reason: HOSPADM

## 2024-05-29 RX ORDER — ONDANSETRON 2 MG/ML
INJECTION INTRAMUSCULAR; INTRAVENOUS PRN
Status: DISCONTINUED | OUTPATIENT
Start: 2024-05-29 | End: 2024-05-29

## 2024-05-29 RX ORDER — OFLOXACIN 3 MG/ML
5 SOLUTION AURICULAR (OTIC) DAILY
Status: DISCONTINUED | OUTPATIENT
Start: 2024-05-29 | End: 2024-05-29

## 2024-05-29 RX ORDER — CEFAZOLIN SODIUM 2 G/100ML
2 INJECTION, SOLUTION INTRAVENOUS EVERY 8 HOURS
Qty: 200 ML | Refills: 0 | Status: COMPLETED | OUTPATIENT
Start: 2024-05-29 | End: 2024-05-30

## 2024-05-29 RX ORDER — DEXAMETHASONE SODIUM PHOSPHATE 4 MG/ML
INJECTION, SOLUTION INTRA-ARTICULAR; INTRALESIONAL; INTRAMUSCULAR; INTRAVENOUS; SOFT TISSUE PRN
Status: DISCONTINUED | OUTPATIENT
Start: 2024-05-29 | End: 2024-05-29

## 2024-05-29 RX ORDER — CEFAZOLIN SODIUM/WATER 2 G/20 ML
2 SYRINGE (ML) INTRAVENOUS SEE ADMIN INSTRUCTIONS
Status: DISCONTINUED | OUTPATIENT
Start: 2024-05-29 | End: 2024-05-29 | Stop reason: HOSPADM

## 2024-05-29 RX ORDER — PROCHLORPERAZINE MALEATE 5 MG
5 TABLET ORAL EVERY 6 HOURS PRN
Status: DISCONTINUED | OUTPATIENT
Start: 2024-05-29 | End: 2024-05-30 | Stop reason: HOSPADM

## 2024-05-29 RX ORDER — ACETAMINOPHEN 325 MG/1
975 TABLET ORAL EVERY 8 HOURS
Qty: 27 TABLET | Refills: 0 | Status: DISCONTINUED | OUTPATIENT
Start: 2024-05-29 | End: 2024-05-30 | Stop reason: HOSPADM

## 2024-05-29 RX ORDER — NALOXONE HYDROCHLORIDE 0.4 MG/ML
0.2 INJECTION, SOLUTION INTRAMUSCULAR; INTRAVENOUS; SUBCUTANEOUS
Status: DISCONTINUED | OUTPATIENT
Start: 2024-05-29 | End: 2024-05-30 | Stop reason: HOSPADM

## 2024-05-29 RX ORDER — OXYCODONE HYDROCHLORIDE 5 MG/1
5 TABLET ORAL
Status: DISCONTINUED | OUTPATIENT
Start: 2024-05-29 | End: 2024-05-30 | Stop reason: HOSPADM

## 2024-05-29 RX ORDER — ONDANSETRON 2 MG/ML
4 INJECTION INTRAMUSCULAR; INTRAVENOUS EVERY 6 HOURS PRN
Status: DISCONTINUED | OUTPATIENT
Start: 2024-05-29 | End: 2024-05-30 | Stop reason: HOSPADM

## 2024-05-29 RX ORDER — AMOXICILLIN 250 MG
1-2 CAPSULE ORAL 2 TIMES DAILY
Qty: 30 TABLET | Refills: 0 | Status: SHIPPED | OUTPATIENT
Start: 2024-05-29

## 2024-05-29 RX ORDER — BISACODYL 10 MG
10 SUPPOSITORY, RECTAL RECTAL DAILY PRN
Status: DISCONTINUED | OUTPATIENT
Start: 2024-05-29 | End: 2024-05-30 | Stop reason: HOSPADM

## 2024-05-29 RX ADMIN — Medication 2 G: at 09:30

## 2024-05-29 RX ADMIN — TRANEXAMIC ACID 1 G: 10 INJECTION, SOLUTION INTRAVENOUS at 10:59

## 2024-05-29 RX ADMIN — SENNOSIDES AND DOCUSATE SODIUM 1 TABLET: 50; 8.6 TABLET ORAL at 13:53

## 2024-05-29 RX ADMIN — ROCURONIUM BROMIDE 30 MG: 50 INJECTION, SOLUTION INTRAVENOUS at 09:45

## 2024-05-29 RX ADMIN — TRANEXAMIC ACID 1 G: 10 INJECTION, SOLUTION INTRAVENOUS at 09:43

## 2024-05-29 RX ADMIN — ACETAMINOPHEN 975 MG: 325 TABLET, FILM COATED ORAL at 13:52

## 2024-05-29 RX ADMIN — Medication 2.5 MG: at 10:17

## 2024-05-29 RX ADMIN — OXYCODONE HYDROCHLORIDE 5 MG: 5 TABLET ORAL at 22:08

## 2024-05-29 RX ADMIN — OXYCODONE HYDROCHLORIDE 10 MG: 5 TABLET ORAL at 16:55

## 2024-05-29 RX ADMIN — FENTANYL CITRATE 50 MCG: 50 INJECTION, SOLUTION INTRAMUSCULAR; INTRAVENOUS at 11:54

## 2024-05-29 RX ADMIN — OXYCODONE HYDROCHLORIDE 10 MG: 5 TABLET ORAL at 13:52

## 2024-05-29 RX ADMIN — ROCURONIUM BROMIDE 30 MG: 50 INJECTION, SOLUTION INTRAVENOUS at 09:26

## 2024-05-29 RX ADMIN — ONDANSETRON 4 MG: 2 INJECTION INTRAMUSCULAR; INTRAVENOUS at 11:01

## 2024-05-29 RX ADMIN — FENTANYL CITRATE 50 MCG: 50 INJECTION INTRAMUSCULAR; INTRAVENOUS at 09:26

## 2024-05-29 RX ADMIN — LIDOCAINE HYDROCHLORIDE 100 MG: 20 INJECTION, SOLUTION INFILTRATION; PERINEURAL at 09:26

## 2024-05-29 RX ADMIN — FENTANYL CITRATE 50 MCG: 50 INJECTION, SOLUTION INTRAMUSCULAR; INTRAVENOUS at 11:41

## 2024-05-29 RX ADMIN — TAMSULOSIN HYDROCHLORIDE 0.4 MG: 0.4 CAPSULE ORAL at 22:04

## 2024-05-29 RX ADMIN — LOSARTAN POTASSIUM 100 MG: 100 TABLET, FILM COATED ORAL at 16:51

## 2024-05-29 RX ADMIN — SODIUM CHLORIDE, POTASSIUM CHLORIDE, SODIUM LACTATE AND CALCIUM CHLORIDE: 600; 310; 30; 20 INJECTION, SOLUTION INTRAVENOUS at 11:58

## 2024-05-29 RX ADMIN — SIMVASTATIN 40 MG: 40 TABLET, FILM COATED ORAL at 22:04

## 2024-05-29 RX ADMIN — PROPOFOL 30 MG: 10 INJECTION, EMULSION INTRAVENOUS at 09:49

## 2024-05-29 RX ADMIN — LATANOPROST 1 DROP: 50 SOLUTION/ DROPS OPHTHALMIC at 20:57

## 2024-05-29 RX ADMIN — CEFAZOLIN SODIUM 2 G: 2 INJECTION, SOLUTION INTRAVENOUS at 16:51

## 2024-05-29 RX ADMIN — ROCURONIUM BROMIDE 10 MG: 50 INJECTION, SOLUTION INTRAVENOUS at 10:37

## 2024-05-29 RX ADMIN — ALLOPURINOL 100 MG: 100 TABLET ORAL at 20:57

## 2024-05-29 RX ADMIN — SODIUM CHLORIDE, POTASSIUM CHLORIDE, SODIUM LACTATE AND CALCIUM CHLORIDE: 600; 310; 30; 20 INJECTION, SOLUTION INTRAVENOUS at 08:09

## 2024-05-29 RX ADMIN — SENNOSIDES AND DOCUSATE SODIUM 1 TABLET: 50; 8.6 TABLET ORAL at 20:57

## 2024-05-29 RX ADMIN — PROPOFOL 150 MG: 10 INJECTION, EMULSION INTRAVENOUS at 09:26

## 2024-05-29 RX ADMIN — ACETAMINOPHEN 975 MG: 325 TABLET, FILM COATED ORAL at 22:04

## 2024-05-29 RX ADMIN — PROPOFOL 30 MCG/KG/MIN: 10 INJECTION, EMULSION INTRAVENOUS at 09:30

## 2024-05-29 RX ADMIN — SUGAMMADEX 200 MG: 100 INJECTION, SOLUTION INTRAVENOUS at 11:17

## 2024-05-29 RX ADMIN — HYDROMORPHONE HYDROCHLORIDE 0.4 MG: 0.2 INJECTION, SOLUTION INTRAMUSCULAR; INTRAVENOUS; SUBCUTANEOUS at 12:12

## 2024-05-29 RX ADMIN — DEXAMETHASONE SODIUM PHOSPHATE 10 MG: 4 INJECTION, SOLUTION INTRA-ARTICULAR; INTRALESIONAL; INTRAMUSCULAR; INTRAVENOUS; SOFT TISSUE at 09:43

## 2024-05-29 RX ADMIN — FENTANYL CITRATE 50 MCG: 50 INJECTION INTRAMUSCULAR; INTRAVENOUS at 09:51

## 2024-05-29 ASSESSMENT — ACTIVITIES OF DAILY LIVING (ADL)
ADLS_ACUITY_SCORE: 20
ADLS_ACUITY_SCORE: 27
ADLS_ACUITY_SCORE: 20
ADLS_ACUITY_SCORE: 27
ADLS_ACUITY_SCORE: 27
ADLS_ACUITY_SCORE: 38
ADLS_ACUITY_SCORE: 20
ADLS_ACUITY_SCORE: 27
ADLS_ACUITY_SCORE: 20
ADLS_ACUITY_SCORE: 20

## 2024-05-29 ASSESSMENT — ENCOUNTER SYMPTOMS: DYSRHYTHMIAS: 1

## 2024-05-29 NOTE — OP NOTE
PREOPERATIVE DIAGNOSES:   Left glenohumeral osteoarthrosis, advanced.     POSTOPERATIVE DIAGNOSES:  Left glenohumeral osteoarthrosis, advanced, with high grade partial RCT.     PROCEDURES:   1.  Left reverse total shoulder arthroplasty.   2.  Left long head biceps tenodesis.        SURGEON:  James Farley MD      ASSISTANT:  Zi Tamayo PA-C      ANESTHESIA:  General endotracheal.      ESTIMATED BLOOD LOSS:  100 mL.      FLUID REPLACEMENT:  Two liters crystalloid.      COMPLICATIONS:  No immediate complications.      INDICATIONS:  Please see preoperative clinical notes.      EXAMINATION OF LEFT SHOULDER UNDER ANESTHESIA FINDINGS:  Passive range of motion 115/60/60/65/40.      COMPONENTS UTILIZED:   1.  Tornier Ascend Flex humeral component, size 5B, secured in roughly 20 degrees retroversion with cementless fixation.   2.  Standard low offset tray with maximal offset posterior/inferior.   3.  A 9 mm polyethylene insert.   4.  Standard 25 mm baseplate.  5.  39 +4mm lateralized glenosphere component.      SCREW CONFIGURATION:   1.  Superior locking screw, 29 mm.   2.  Inferior locking screw, 32 mm.   3.  Anterior compression screw, 20 mm, directed medial, with excellent fixation.     DESCRIPTION OF PROCEDURE:  The patient was identified in the preoperative holding area and taken to room #33 of the main OR.  Monitors were placed per the Anesthesia Service.  After smooth IV induction, general anesthesia was introduced and his endotracheal tube secured.  He was given 2 g Ancef IV.  He was then repositioned in the modified beach chair position with the head and neck secured in neutral position and all peripheral extremities thoroughly padded with foam.  Left upper extremity was widely prepped and draped in the usual sterile fashion.  HANNY hose and pneumoboots were in place and operational during the procedure.      Surgical team took timeout to confirm the correct patient, correct site marking, correct equipment  and implants, correct images were available, and that she had been administered IV antibiotic therapy.      An oblique skin incision was centered over the anterior left shoulder.  Skin flaps elevated.  Cephalic vein identified, retracted laterally with the deltoid, and the conjoined tendons, swept medially without excessive tension on the musculocutaneous nerve.       The subscapularis was tenotomized en bloc with the anterior capsule and elevated medially, while protecting the medial neurovascular bundle.  The posterosuperior rotator cuff was inspected, and a high grade partial thickness supraspinatus tear with generalized insufficienty was noted.  We therefore elected to proceed with reverse arthroplasty.  We released the long head biceps and excised the intra-articular portion.  The shoulder was then gently dislocated.  Marginal osteophytes which were significant were removed circumferentially around the periphery of the humeral head.  An anatomic neck cut was then performed, and favored a slightly inferior position due to reverse implant placement.  Reaming and broaching was then undertaken up to a size 5B trial, which had excellent fit and no instability.  A low offset tray had appropriate coverage posteroinferior and was accepted in that setting.      The glenoid was then circumferentially exposed.  A complete labrectomy/capsulectomy was performed anterior and posterior to allow full visualization.  The central starting point for reaming was undertaken and this was revisited with sequential reamers, as well as the high speed bur.  Thorough waterpik irrigation was utilized to remove all bony debris peripherally.  The opening drill was then utilized and after thorough irrigation, we then impacted the baseplate to appropriate press-fit with excellent fixation and backed this up with superior and inferior locking screws, as well as anterior compression screw, obtaining excellent fixation of the construct.    Thorough irrigation was then performed.  After thorough lavage, the glenosphere was impacted and had excellent press-fit and then backed up with a central locking screw.      We then repeated trialing on the humeral side and a 9 mm insert that had appropriate soft tissue tension without rocking or instability was accepted.  The humeral trials were then removed.  Thorough irrigation was then performed.  The stem was impacted to appropriate press-fit with excellent security.  The trunnion was pulse-lavaged and suctioned dry, and the tray was then impacted in the chosen setting.  Following this, the polyethylene insert was snap fit and impacted with excellent fixation.  The shoulder was then reduced, found to have excellent soft tissue tension, no signs of rocking or instability throughout a full range of motion was accepted.      The arm was then placed in the 30/30 position.  After thorough irrigation, we closed the subscapularis and anterior capsule back to the lateral soft tissues, as well as the lesser tuberosity with several figure-of-eight #2 Ethibond sutures.  The proximal portion of the subscapularis was closed to the leading edge of the rotator cuff posteriorly to completely cover the implant.  Excellent overall closure and fixation was noted.  Laterally and inferiorly we incorporated the long head biceps for soft tissue tenodesis.      The axillary nerve was palpated and found to be intact.  After thorough irrigation, we then closed the deltopectoral interval with #2 Ethibond suture.  The deltoid musculature, as well as the subcutaneous tissue planes were then infiltrated with a total of 20 mL of 0.5% ropivacaine plain, 15 mg toradol, and 1 mg Duramorph.  The skin was closed in layers with 3-0 Monocryl and 3-0 Prolene running subcuticular for skin.  Steri-Strips and a bulky sterile dressing were applied.  He was then placed into an EZ wrap device and UltraSling in neutral position, was then reversed,  extubated and taken back to the recovery room in stable condition.  There were no immediate complications and he appeared to tolerate the procedure well.      A skilled first assistant was necessary for this procedure for assistance with patient positioning, prepping, draping, surgical visualization, implantation of the prosthesis, wound closure, dressing and sling application.      PQRI MEASURES:   1.  The patient was given 2 g Ancef within 1 hour prior to skin incision.  IV antibiotic therapy was discontinued within 24 hours postoperatively.   2.  Deep venous thrombosis prophylaxis will be with resumption of his baseline Xarelto.  3.  UltraSling x 6 weeks for protection.   4.  Standard phase 1 reverse TSA protocol for physical therapy.   5.  He should have a true AP and outlet radiograph of the left shoulder at return to office in 10 days for suture removal.

## 2024-05-29 NOTE — ANESTHESIA PREPROCEDURE EVALUATION
Anesthesia Pre-Procedure Evaluation    Patient: Chinmay Navas   MRN: [id number] : 1940        Procedure : Procedure(s):  LEFT TOTAL SHOULDER ARTHROPLASTY VERSUS  REVERSE TOTAL SHOULDER ARTHROPLASTY          Past Medical History:   Diagnosis Date    Actinic keratosis     Acute diastolic congestive heart failure (H)     Arthritis     osteoarthritis of knee    Atrial fibrillation (H)     Atrial flutter, chronic (H)     Coronary artery disease     GERD (gastroesophageal reflux disease)     Glaucoma     Gout     Gouty arthropathy     Hypercholesteremia     Hypertension     Impotence of organic origin     Macular degeneration, wet (H)     Malignant neoplasm of prostate (H)     NSTEMI (non-ST elevated myocardial infarction) (H)     Prostate cancer (H)     seeds placed    Renal insufficiency     Sleep apnea     pt denies    Unspecified cataract       Past Surgical History:   Procedure Laterality Date    ARTHROPLASTY HIP ANTERIOR Left 2019    Procedure: DIRECT ANTERIOR LEFT TOTAL HIP ARTHROPLASTY AND RIGHT HIP NAILINIG WITH ACE CANNULATED SCREWS;  Surgeon: Demetrius Hernandes MD;  Location:  OR    CARDIAC SURGERY      ablation for a fib    CARDIAC SURGERY      heart stent X 1    CVL CORONARY ANGIOGRAM       ,     GENITOURINARY SURGERY      brachytherapy    OPEN REDUCTION INTERNAL FIXATION HIP Right 2019    Procedure: RIGHT HIP NAILING WITH ACE CANNULATED SCREWS;  Surgeon: Demetrius Hernandes MD;  Location:  OR    Open right INGUINAL hernia repair with mesh placement  Right 10/2021    ORTHOPEDIC SURGERY Right 2019    right shoulder replacement      Allergies   Allergen Reactions    Atorvastatin Diarrhea    Latex Unknown    Lisinopril Cough     Other Reaction(s): Runny Nose    Shellfish-Derived Products Other (See Comments)     Gets gout    Adhesive Tape Rash     More issues with paper tape    Other Reaction(s): Erythema    Other reaction(s): Erythema      Social History     Tobacco  "Use    Smoking status: Former     Current packs/day: 0.00     Types: Cigarettes     Quit date: 1979     Years since quittin.4    Smokeless tobacco: Never   Substance Use Topics    Alcohol use: Yes     Comment: 4 drinks per week      Wt Readings from Last 1 Encounters:   24 83 kg (183 lb)        Anesthesia Evaluation            ROS/MED HX  ENT/Pulmonary:     (+) sleep apnea,                                       Neurologic:       Cardiovascular:     (+)  hypertension- -  CAD - past MI - stent-      CHF                  dysrhythmias, a-fib and a-flutter,             METS/Exercise Tolerance:     Hematologic:       Musculoskeletal:       GI/Hepatic:     (+) GERD,                   Renal/Genitourinary:     (+) renal disease, type: CRI,            Endo:       Psychiatric/Substance Use:       Infectious Disease:       Malignancy:   (+) Malignancy, History of Prostate.    Other:            Physical Exam    Airway        Mallampati: II   TM distance: > 3 FB   Neck ROM: full   Mouth opening: > 3 cm    Respiratory Devices and Support         Dental       (+) Modest Abnormalities - crowns, retainers, 1 or 2 missing teeth      Cardiovascular   cardiovascular exam normal          Pulmonary   pulmonary exam normal                OUTSIDE LABS:  CBC:   Lab Results   Component Value Date    HGB 11.8 (L) 2019    HGB 14.3 2019     2019     BMP:   Lab Results   Component Value Date     2019    POTASSIUM 3.8 2019    POTASSIUM 3.6 2019    CHLORIDE 106 2019    CO2 28 2019    BUN 25 2019    CR 1.26 (H) 2019    CR 1.12 2019     (H) 2019     COAGS: No results found for: \"PTT\", \"INR\", \"FIBR\"  POC: No results found for: \"BGM\", \"HCG\", \"HCGS\"  HEPATIC: No results found for: \"ALBUMIN\", \"PROTTOTAL\", \"ALT\", \"AST\", \"GGT\", \"ALKPHOS\", \"BILITOTAL\", \"BILIDIRECT\", \"MORIS\"  OTHER:   Lab Results   Component Value Date    WILLY 8.9 2019 " "      Anesthesia Plan    ASA Status:  3    NPO Status:  NPO Appropriate    Anesthesia Type: General.     - Airway: ETT   Induction: Intravenous.   Maintenance: Balanced.        Consents    Anesthesia Plan(s) and associated risks, benefits, and realistic alternatives discussed. Questions answered and patient/representative(s) expressed understanding.     - Discussed:     - Discussed with:  Patient            Postoperative Care    Pain management: IV analgesics.   PONV prophylaxis: Ondansetron (or other 5HT-3), Dexamethasone or Solumedrol, Background Propofol Infusion     Comments:               JULIAN BORREGO MD    I have reviewed the pertinent notes and labs in the chart from the past 30 days and (re)examined the patient.  Any updates or changes from those notes are reflected in this note.            # Drug Induced Coagulation Defect: home medication list includes an anticoagulant medication   # Overweight: Estimated body mass index is 26.26 kg/m  as calculated from the following:    Height as of this encounter: 1.778 m (5' 10\").    Weight as of this encounter: 83 kg (183 lb).      "

## 2024-05-29 NOTE — PROGRESS NOTES
S: Pt. was not seen. Roni CAMEJO. RX: Ultrasling 3 left Large. DX: Shoulder surgery.  O:A: Today I delivered a Ultrasling 3 left size Large to the Pre-OP pool room.   P: Pt. to be seen as needed.    Brendan MOSS, MIKE

## 2024-05-29 NOTE — ANESTHESIA POSTPROCEDURE EVALUATION
Patient: Chinmay Navas    Procedure: Procedure(s):  REVERSE TOTAL SHOULDER ARTHROPLASTY       Anesthesia Type:  General    Note:  Disposition: Admission   Postop Pain Control: Uneventful            Sign Out: Well controlled pain   PONV: No   Neuro/Psych: Uneventful            Sign Out: Acceptable/Baseline neuro status   Airway/Respiratory: Uneventful            Sign Out: Acceptable/Baseline resp. status   CV/Hemodynamics: Uneventful            Sign Out: Acceptable CV status; No obvious hypovolemia; No obvious fluid overload   Other NRE: NONE   DID A NON-ROUTINE EVENT OCCUR? No           Last vitals:  Vitals Value Taken Time   /78 05/29/24 1220   Temp 36.1  C (96.9  F) 05/29/24 1220   Pulse 66 05/29/24 1230   Resp 12 05/29/24 1230   SpO2 97 % 05/29/24 1231   Vitals shown include unfiled device data.    Electronically Signed By: JULIAN BORREGO MD  May 29, 2024  12:48 PM

## 2024-05-29 NOTE — ANESTHESIA CARE TRANSFER NOTE
Patient: Chinmay Navas    Procedure: Procedure(s):  REVERSE TOTAL SHOULDER ARTHROPLASTY       Diagnosis: Osteoarthritis of glenohumeral joint [M19.019]  Diagnosis Additional Information: No value filed.    Anesthesia Type:   General     Note:    Oropharynx: oropharynx clear of all foreign objects  Level of Consciousness: awake  Oxygen Supplementation: face mask  Level of Supplemental Oxygen (L/min / FiO2): 8  Independent Airway: airway patency satisfactory and stable  Dentition: dentition unchanged  Vital Signs Stable: post-procedure vital signs reviewed and stable  Report to RN Given: handoff report given  Patient transferred to: PACU  Comments: Spontaneously breathing with adequate vT, sats 98 - 100%, TOF 4/4 with sustained tetany. Purposeful movement, following commands with 100% O2 at 15 L/min, suctioned x2, Anesthesiologist notified.  Extubated.  To PACU with O2 via SFM at 10 L/minute, VSS. Monitors on, report to RN.  Handoff Report: Identifed the Patient, Identified the Reponsible Provider, Reviewed the pertinent medical history, Discussed the surgical course, Reviewed Intra-OP anesthesia mangement and issues during anesthesia, Set expectations for post-procedure period and Allowed opportunity for questions and acknowledgement of understanding      Vitals:  Vitals Value Taken Time   /77 05/29/24 1130   Temp     Pulse 62 05/29/24 1133   Resp 24 05/29/24 1133   SpO2 99 % 05/29/24 1133   Vitals shown include unfiled device data.    Electronically Signed By: WILL Bhatt CRNA  May 29, 2024  11:34 AM

## 2024-05-29 NOTE — ANESTHESIA PROCEDURE NOTES
Airway       Patient location during procedure: OR       Procedure Start/Stop Times: 5/29/2024 9:27 AM  Staff -        Anesthesiologist:  Raheem Crabtree MD       Performed By: anesthesiologist  Consent for Airway        Urgency: elective  Indications and Patient Condition       Indications for airway management: vinod-procedural       Induction type:intravenous       Mask difficulty assessment: 2 - vent by mask + OA or adjuvant +/- NMBA    Final Airway Details       Final airway type: endotracheal airway       Successful airway: ETT - single  Endotracheal Airway Details        ETT size (mm): 8.0       Cuffed: yes       Successful intubation technique: direct laryngoscopy       Grade View of Cords: 1       Position: Right       Measured from: lips       Secured at (cm): 23       Bite block used: None    Post intubation assessment        Placement verified by: capnometry, equal breath sounds and chest rise        Number of attempts at approach: 1       Secured with: tape       Ease of procedure: easy       Dentition: Intact and Unchanged    Medication(s) Administered   Medication Administration Time: 5/29/2024 9:27 AM

## 2024-05-29 NOTE — PROGRESS NOTES
Patient vital signs are at baseline: Yes  Patient able to ambulate as they were prior to admission or with assist devices provided by therapies during their stay:  No,  Reason:  Not OOB this shift  Patient MUST void prior to discharge:  Yes  Patient able to tolerate oral intake:  Yes  Pain has adequate pain control using Oral analgesics:  Yes  Does patient have an identified :  Yes  Has goal D/C date and time been discussed with patient:  Yes    Diagnosis: Reverse Total Shoulder Arthroplasty  POD#: 0  Mental Status: A&O x4  Activity/dangle: Not OOB yet  Diet: Regular diet  Pain: Managed with Oxycodone  Sanchez/Voiding: Voiding in the urinal  Tele/Restraints/Iso: N/A  02/LDA: Room air. LR infusing at 75 mL/hr  D/C Date: Pending  Other Info: CMS intact. R Shoulder dressing is CDI.

## 2024-05-30 ENCOUNTER — APPOINTMENT (OUTPATIENT)
Dept: OCCUPATIONAL THERAPY | Facility: CLINIC | Age: 84
End: 2024-05-30
Attending: ORTHOPAEDIC SURGERY
Payer: COMMERCIAL

## 2024-05-30 VITALS
BODY MASS INDEX: 26.2 KG/M2 | WEIGHT: 183 LBS | DIASTOLIC BLOOD PRESSURE: 66 MMHG | SYSTOLIC BLOOD PRESSURE: 118 MMHG | OXYGEN SATURATION: 94 % | RESPIRATION RATE: 16 BRPM | HEIGHT: 70 IN | TEMPERATURE: 99.2 F | HEART RATE: 89 BPM

## 2024-05-30 LAB — HGB BLD-MCNC: 11.9 G/DL (ref 13.3–17.7)

## 2024-05-30 PROCEDURE — 85018 HEMOGLOBIN: CPT | Performed by: ORTHOPAEDIC SURGERY

## 2024-05-30 PROCEDURE — 97110 THERAPEUTIC EXERCISES: CPT | Mod: GO

## 2024-05-30 PROCEDURE — 97535 SELF CARE MNGMENT TRAINING: CPT | Mod: GO

## 2024-05-30 PROCEDURE — 250N000013 HC RX MED GY IP 250 OP 250 PS 637: Performed by: ORTHOPAEDIC SURGERY

## 2024-05-30 PROCEDURE — 97165 OT EVAL LOW COMPLEX 30 MIN: CPT | Mod: GO

## 2024-05-30 PROCEDURE — 250N000011 HC RX IP 250 OP 636: Performed by: ORTHOPAEDIC SURGERY

## 2024-05-30 PROCEDURE — 36415 COLL VENOUS BLD VENIPUNCTURE: CPT | Performed by: ORTHOPAEDIC SURGERY

## 2024-05-30 RX ADMIN — CEFAZOLIN SODIUM 2 G: 2 INJECTION, SOLUTION INTRAVENOUS at 00:40

## 2024-05-30 RX ADMIN — SENNOSIDES AND DOCUSATE SODIUM 1 TABLET: 50; 8.6 TABLET ORAL at 08:08

## 2024-05-30 RX ADMIN — ALLOPURINOL 100 MG: 100 TABLET ORAL at 08:08

## 2024-05-30 RX ADMIN — ACETAMINOPHEN 975 MG: 325 TABLET, FILM COATED ORAL at 05:57

## 2024-05-30 RX ADMIN — METOPROLOL SUCCINATE 25 MG: 25 TABLET, EXTENDED RELEASE ORAL at 08:08

## 2024-05-30 RX ADMIN — OXYCODONE HYDROCHLORIDE 5 MG: 5 TABLET ORAL at 01:34

## 2024-05-30 RX ADMIN — POLYETHYLENE GLYCOL 3350 17 G: 17 POWDER, FOR SOLUTION ORAL at 08:09

## 2024-05-30 RX ADMIN — OXYCODONE HYDROCHLORIDE 5 MG: 5 TABLET ORAL at 08:09

## 2024-05-30 RX ADMIN — LOSARTAN POTASSIUM 100 MG: 100 TABLET, FILM COATED ORAL at 08:08

## 2024-05-30 RX ADMIN — HYDROCHLOROTHIAZIDE 25 MG: 25 TABLET ORAL at 08:08

## 2024-05-30 ASSESSMENT — ACTIVITIES OF DAILY LIVING (ADL)
ADLS_ACUITY_SCORE: 27
ADLS_ACUITY_SCORE: 25
ADLS_ACUITY_SCORE: 27
ADLS_ACUITY_SCORE: 25

## 2024-05-30 NOTE — PROGRESS NOTES
POD #1  S: S/P Left reverse total shoulder arthroplasty. Moderate pain, controlled with oxycodone. Tolerating medications well. No nausea/vomiting/diarrhea. No fever/chills reported. Has not started PT/OT. Compliant with sling.     O: B/P: 132/69, T: 97.6, P: 68, R: 18     Alert, seated. NAD.  Left UE: Dressing clean/dry/intact. Deltoid/elbow & wrist motor intact. Distally motor and sensory intact.   B/L LE: Hawk's negative B/L.      X-ray: Postop xrays of left shoulder demonstrate good position of the reverse total shoulder implants with no loosening, lysis or interval complications evident.       S/P Left Reverse Total Shoulder Arthroplasty    Plan: Start PT/OT per standard reverse TSA protocol.  Wear sling. Change dressing to medipore dressing. Keep new dressing clean/dry/intact. Sponge bathe only. Continue pain mgmt with oxycodone,etc. Plan discharge home today. DVT prophylaxis with restarting Xarelto POD #1. Follow up with Dr. Farley in clinic in 10-14 days.

## 2024-05-30 NOTE — PROGRESS NOTES
Patient vital signs are at baseline: Yes  Patient able to ambulate as they were prior to admission or with assist devices provided by therapies during their stay:  Yes  Patient MUST void prior to discharge:  Yes  Patient able to tolerate oral intake:  Yes  Pain has adequate pain control using Oral analgesics:  Yes  Does patient have an identified :  Yes  Has goal D/C date and time been discussed with patient:  Yes    Diagnosis: Left Reverse Total Shoulder Arthroplasty  POD#:1  Mental Status: A&O x4  Activity/dangle: SB Ast w/ GB  Diet: Regular diet  Pain: Managed with Oxycodone  Sanchez/Voiding: Voiding adequately in the bathroom  Tele/Restraints/Iso: N/A  02/LDA: Room air. PIV removed  Other Info: CMS intact. L Shoulder dressing changed to Medipore pad per order. Went over AVS and discharge medications, all questions answered. All belongings taken by pt and family. Discharge home with daughter and spouse.

## 2024-05-30 NOTE — PROGRESS NOTES
Date/Time5/29 doroteo    Trauma/Ortho/Medical (Choose one) ortho    Diagnosis:left reverse total shoulder  POD#:DOS  Mental Status:a&o  Activity/dangle up in room/burk/to bathroom with belt and SBA  Diet:regular  Pain:oxycodone- taking 1 tablet  Sanchez/Voiding:voiding per bathroom  Tele/Restraints/Iso:no  02/LDA:room air, IVF  D/C Date:thursday  Other Info:ultrasling on. Does not want to be woke for pain meds.

## 2024-05-30 NOTE — PLAN OF CARE
Goal Outcome Evaluation:              Summary:    Date/Time5/30-24 [id number]  Diagnosis:left reverse total shoulder  POD#:1  Mental Status:Alert x4  Activity/dangle up with one with Walker/GB  Diet:regular  Pain:oxycodone, schedule tylenol   Sanchez/Voiding:voiding per bathroom  Tele/Restraints/Iso:no  02/LDA:room air, Saline lock  D/C Date:today  Other Info: L shoulder incision dressing intact, cms intact sling on.

## 2024-05-30 NOTE — PROGRESS NOTES
05/30/24 0943   Appointment Info   Signing Clinician's Name / Credentials (OT) SAMMY Gomez   Student Supervision Direct Patient Contact Provided   Rehab Comments (OT) 1 eval, 1 ADL, 1 TA, 1 TE   Living Environment   People in Home spouse   Current Living Arrangements house   Home Accessibility stairs to enter home;stairs within home   Number of Stairs, Main Entrance 2   Stair Railings, Main Entrance railing on right side (ascending)   Number of Stairs, Within Home, Primary ten   Stair Railings, Within Home, Primary railing on right side (ascending)   Transportation Anticipated family or friend will provide   Living Environment Comments Pt lives at home with spouse. Stairs within home, but has a chair lift to take him up and down. Pt has a walk in shower, grab bars next to toilet and in shower, and a shower chair. Spouse and daughter can help around house as needed.   Self-Care   Usual Activity Tolerance good   Current Activity Tolerance good   Equipment Currently Used at Home shower chair;grab bar, toilet;grab bar, tub/shower   Fall history within last six months no   Activity/Exercise/Self-Care Comment Pt ind in all ADLs at baseline. Does not use AE at baseline for mobility. Pt drives; wife will drive for the time being. Pt would like to be ind in ADLs, but has wife for support as needed.   Instrumental Activities of Daily Living (IADL)   Previous Responsibilities meal prep;shopping;laundry;housekeeping;yardwork;medication management;finances;driving   General Information   Onset of Illness/Injury or Date of Surgery 05/29/24   Referring Physician James Farley MD   Patient/Family Therapy Goal Statement (OT) Be ind in ADLs   Additional Occupational Profile Info/Pertinent History of Current Problem Chinmay Navas is a 83 yr old male w PMH of CHF, A fib, hypertension, and osteoarthritis, s/p L TSA on 5/28/2024. POD1   Existing Precautions/Restrictions fall;shoulder;weight bearing   Left Upper Extremity  (Weight-bearing Status) non weight-bearing (NWB)   Cognitive Status Examination   Orientation Status orientation to person, place and time   Visual Perception   Visual Impairment/Limitations corrective lenses for distance;corrective lenses for reading   Sensory   Sensory Comments No numbness or tingling at baseline.   Pain Assessment   Patient Currently in Pain Yes, see Vital Sign flowsheet  (5/10)   Posture   Posture not impaired   Range of Motion Comprehensive   General Range of Motion bilateral lower extremity ROM WFL;upper extremity range of motion deficits identified   Comment, General Range of Motion secondary to L TSA   Strength Comprehensive (MMT)   General Manual Muscle Testing (MMT) Assessment upper extremity strength deficits identified   Comment, General Manual Muscle Testing (MMT) Assessment secondary to L TSA   Coordination   Upper Extremity Coordination No deficits were identified   Bed Mobility   Bed Mobility supine-sit   Supine-Sit [place] (Bed Mobility) modified independence   Transfers   Transfers sit-stand transfer;toilet transfer   Sit-Stand Transfer   Sit-Stand [place] (Transfers) modified independence   Toilet Transfer   Type (Toilet Transfer) stand-sit;sit-stand   [place] Level (Toilet Transfer) modified independence   Assistive Device (Toilet Transfer) grab bars/safety frame   Balance   Balance Assessment no deficits identified   Activities of Daily Living   BADL Assessment/Intervention upper body dressing;lower body dressing;toileting;grooming   Upper Body Dressing Assessment/Training   Position (Upper Body Dressing) unsupported sitting   Comment, (Upper Body Dressing) Pt retrieved clothes w Mod I from bags, donned shirt w mod I, donned sling w min assist for strap. Will have spouse to assist with this at home   Lower Body Dressing Assessment/Training   Position (Lower Body Dressing) unsupported sitting;unsupported standing   [place] Level (Lower Body Dressing) modified  independence   Grooming Assessment/Training   Position (Grooming) unsupported standing   [place] Level (Grooming) modified independence   Comment, (Grooming) Per clinical judgement   Toileting   Position (Toileting) unsupported standing;unsupported sitting   Assistive Devices (Toileting) grab bar, toilet   [place] Level (Toileting) modified independence   Clinical Impression   Criteria for Skilled Therapeutic Interventions Met (OT) Yes, treatment indicated   OT Diagnosis Decreased ADL independence   OT Problem List-Impairments impacting ADL activity tolerance impaired;pain;post-surgical precautions   Assessment of Occupational Performance 1-3 Performance Deficits   Identified Performance Deficits UB dressing   Planned Therapy Interventions (OT) ADL retraining;orthotic fitting/training;transfer training;progressive activity/exercise;ROM;strengthening   Clinical Decision Making Complexity (OT) problem focused assessment/low complexity   Risk & Benefits of therapy have been explained patient   OT Total Evaluation Time   OT Eval, Low Complexity Minutes (99545) 10   Therapy Certification   Medical Diagnosis TSA   Start of Care Date 05/30/24   Certification date from 05/30/24   Certification date to 05/30/24   OT Goals   Therapy Frequency (OT) One time eval and treatment   OT Predicted Duration/Target Date for Goal Attainment 05/30/24   OT Goals Upper Body Dressing;Lower Body Dressing;Toilet Transfer/Toileting;OT Goal 1   OT: Upper Body Dressing Modified independent;within precautions   OT: Lower Body Dressing Modified independent;including set-up/clothing retrieval   OT: Toilet Transfer/Toileting Modified independent;within precautions   OT: Goal 1 Pt will demo two stairs w use of railing on right side to stimulate home with mod I for increased independence with I/ADLs   Interventions   Interventions Quick Adds Self-Care/Home Management;Therapeutic Activity;Therapeutic Procedures/Exercise   Self-Care/Home  Management   Self-Care/Home Mgmt/ADL, Compensatory, Meal Prep Minutes (00774) 12   Symptoms Noted During/After Treatment (Meal Preparation/Planning Training) none   Treatment Detail/Skilled Intervention Pt greeted sitting up in chair, agreeable to OT. Pt completed STS w Mod I, ambulated to bed, and completed stand to sit in bed w Mod I. Pt completed sit to supine and supine to sit bed transfer w Mod I. Pt ambulated to bathroom and performed simulated toilet transfer w Mod I, VCs for techniques. Pt ambulated back to EOB w Mod I. Pt retrieved clothes w Mod I for increased time, returned back to bed w Mod I. Pt edu on donning/doffing sling techniques, demo doffing of sling w Mod I and VCs for techniques. Pt donned underwear and pants w Mod I, seated EOB. Pt completed STS w mod I and finished LB dressing tasks while standing w Mod I, VC to maintain shoulder prec/avoid pulling up pants with L hand. Pt edu on UB dressing techniques, donned shirt w buttons w Mod I. Pt donned sling w min a for 1 shoulder strap, but Mod I for the rest of the task. VCs given throughout. Pt given ice with edu on icing techniques, left in chair w all needs met, call alarm in reach, chair alarm on   Therapeutic Procedures/Exercise   Therapeutic Procedure: strength, endurance, ROM, flexibillity minutes (56659) 12   Symptoms Noted During/After Treatment increased pain   Treatment Detail/Skilled Intervention Pt edu on at home ROM exercises for LUE. Pt engaged in fist pumps, wrist flex/ext, wrist pro/sup, elbow flex/ext w VC for technique. Pt completed 1 round of 10 reps of each exercise. Pt also provided demo for isometric deltoid exercise, pt completed exercise 10x, holding for 3-5 seconds against the wall w Mod I and VCs for correct posture and techniques.   Therapeutic Activities   Therapeutic Activity Minutes (12786) 8   Symptoms noted during/after treatment none   Treatment Detail/Skilled Intervention Pt ambulated through hallway w Mod I.  Therapist provided edu on stair mobility side-step techniques. Pt completed 2 steps up and down w right sided railing w Mod I and VCs for techniques to simulate home set up.   OT Discharge Planning   OT Plan d/c   OT Discharge Recommendation (DC Rec) other (see comments)  (defer to ortho)   OT Rationale for DC Rec Pt functioning slightly below baseline for ADL independence d/t decreased activity tolerance. Pt currently Mod I for transfers, ADLs. Min assist for one part of sling donning - pt will have support and assistance at home for this. Pt verbalized understanding of edu that was provided. No further acute OT needs.   OT Brief overview of current status see above   Total Session Time   Timed Code Treatment Minutes 32   Total Session Time (sum of timed and untimed services) 42    [facility]  OUTPATIENT OCCUPATIONAL THERAPY  EVALUATION  PLAN OF TREATMENT FOR OUTPATIENT REHABILITATION  (COMPLETE FOR INITIAL CLAIMS ONLY)  Patient's Last Name, First Name, M.I.  YOB: 1940  YosefChinmay  JENNIFER                          Provider's Name  [facility] Medical Record No.  [id number]                             Onset Date:  05/29/24   Start of Care Date:  05/30/24   Type:     ___PT   _X_OT   ___SLP Medical Diagnosis:  TSA                    OT Diagnosis:  Decreased ADL independence Visits from SOC:  1     See note for plan of treatment, functional goals and certification details    I CERTIFY THE NEED FOR THESE SERVICES FURNISHED UNDER        THIS PLAN OF TREATMENT AND WHILE UNDER MY CARE     (Physician co-signature of this document indicates review and certification of the therapy plan).

## 2024-05-30 NOTE — PLAN OF CARE
Occupational Therapy Discharge Summary    Reason for therapy discharge:    All goals and outcomes met, no further needs identified.    Progress towards therapy goal(s). See goals on Care Plan in Epic electronic health record for goal details.  Goals met    Therapy recommendation(s):    Pt functioning slightly below baseline for ADL independence d/t decreased activity tolerance. Pt currently Mod I for transfers, ADLs. Min assist for one part of sling donning - pt will have support and assistance at home for this. Pt verbalized understanding of edu that was provided. No further acute OT needs.

## 2024-06-09 ENCOUNTER — HEALTH MAINTENANCE LETTER (OUTPATIENT)
Age: 84
End: 2024-06-09

## 2025-06-15 ENCOUNTER — HEALTH MAINTENANCE LETTER (OUTPATIENT)
Age: 85
End: 2025-06-15

## (undated) DEVICE — ESU GROUND PAD UNIVERSAL W/O CORD

## (undated) DEVICE — CATH FOLEY COUDE 14FR 5ML LUBRICATH LATEX 0168L14

## (undated) DEVICE — GLOVE PROTEXIS POWDER FREE 6.5 ORTHOPEDIC 2D73ET65

## (undated) DEVICE — IMP SCR BONE CAN ACE 6.5X25MM 1217-25-500: Type: IMPLANTABLE DEVICE | Site: HIP | Status: NON-FUNCTIONAL

## (undated) DEVICE — SU ETHIBOND 2 V-37 4X30" MX69G

## (undated) DEVICE — SPONGE PACK VAGINAL 2X36"

## (undated) DEVICE — SOL WATER IRRIG 1000ML BOTTLE 2F7114

## (undated) DEVICE — Device

## (undated) DEVICE — DECANTER BAG 2002S

## (undated) DEVICE — KIT PATIENT CARE HANA TABLE PROFX SUPINE 6855

## (undated) DEVICE — DRAPE STERI TOWEL LG 1010

## (undated) DEVICE — ESU PENCIL W/HOLSTER E2350H

## (undated) DEVICE — BLADE SAW SAGITTAL STRK 29X83.5X1.27MM 4/2000 2108-183-000

## (undated) DEVICE — CATH TRAY FOLEY COUDE SURESTEP 16FR W/URNE MTR STLK A304716A

## (undated) DEVICE — SU VICRYL 2-0 CP-1 27" UND J266H

## (undated) DEVICE — WRAP MCCONNELL ARM SUPPORT LG 12-401

## (undated) DEVICE — DRAPE IOBAN INCISE 23X17" 6650EZ

## (undated) DEVICE — MANIFOLD NEPTUNE 4 PORT 700-20

## (undated) DEVICE — PACK TOTAL HIP W/U DRAPE SOP15HUFSC

## (undated) DEVICE — PIN GUIDE DEPUY ACE KYLE 3.2MMX9" THRD  14012-9

## (undated) DEVICE — DRILL BIT TORNIER 3MM REVERSE STERILE DWD055

## (undated) DEVICE — PACK UNIVERSAL SPLIT 29131

## (undated) DEVICE — PACK OPEN SHOULDER SOP15OCFSC

## (undated) DEVICE — SU PROLENE 3-0 PS-2 18" 8687H

## (undated) DEVICE — SU STRATAFIX PDS PLUS 2-0 SPIRAL CT-1 30CM SXPP1B410

## (undated) DEVICE — DRAPE STERI U 1015

## (undated) DEVICE — SOL NACL 0.9% IRRIG 3000ML BAG 2B7477

## (undated) DEVICE — GLOVE PROTEXIS BLUE W/NEU-THERA 7.0  2D73EB70

## (undated) DEVICE — GLOVE PROTEXIS W/NEU-THERA 6.0  2D73TE60

## (undated) DEVICE — DRAPE C-ARM 60X42" 1013

## (undated) DEVICE — SUCTION IRR SYSTEM W/O TIP INTERPULSE HANDPIECE 0210-100-000

## (undated) DEVICE — GLOVE BIOGEL PI MICRO INDICATOR UNDERGLOVE SZ 8.5 48985

## (undated) DEVICE — SYR 03ML LL W/O NDL 309657

## (undated) DEVICE — SU VICRYL 0 CT-1 CR 8X18" J740D

## (undated) DEVICE — GLOVE BIOGEL PI ULTRATOUCH G SZ 8.5 42185

## (undated) DEVICE — PREP DURAPREP 26ML APL 8630

## (undated) DEVICE — DRAPE SHEET REV FOLD 3/4 9349

## (undated) DEVICE — ESU ELEC NDL 1" E1552

## (undated) DEVICE — ESU PENCIL W/SMOKE EVAC NEPTUNE STRYKER 0703-046-000

## (undated) DEVICE — DRSG TEGADERM 2 1/2X 2 3/4"

## (undated) DEVICE — GLOVE PROTEXIS W/NEU-THERA 6.5  2D73TE65

## (undated) DEVICE — IMPLANTABLE DEVICE: Type: IMPLANTABLE DEVICE | Site: FEMUR | Status: NON-FUNCTIONAL

## (undated) DEVICE — DRSG KERLIX 4 1/2"X4YDS ROLL 6715

## (undated) DEVICE — SU DERMABOND PRINEO 22CM CLR222US

## (undated) DEVICE — BLADE SAW SAGITTAL STRK 19.5X95X1.27MM 2108-109-000S15

## (undated) DEVICE — PREP CHLORAPREP 26ML TINTED ORANGE  260815

## (undated) DEVICE — LINEN TOWEL PACK X5 5464

## (undated) DEVICE — ESU BIPOLAR SEALER AQUAMANTYS 6MM 23-112-1

## (undated) DEVICE — PACK HIP NAILING SOP15HNSB

## (undated) DEVICE — DRSG STERI STRIP 1/2X4" R1547

## (undated) DEVICE — BNDG COBAN 4"X5YDS STERILE

## (undated) DEVICE — DRAPE CONVERTORS U-DRAPE 60X72" 8476

## (undated) DEVICE — SOLUTION WOUND CLEANSING 3/4OZ 10% PVP EA-L3011FB-50

## (undated) DEVICE — NDL 21GA 1.5"

## (undated) DEVICE — SYR 50ML LL W/O NDL 309653

## (undated) DEVICE — GLOVE PROTEXIS POWDER FREE 8.5 ORTHOPEDIC 2D73ET85

## (undated) DEVICE — SOL NACL 0.9% INJ 250ML BAG 2B1322Q

## (undated) DEVICE — SPONGE RAY-TEC 4X8" 7318

## (undated) DEVICE — SU VICRYL 0 CTX CR 8X18" J764D

## (undated) DEVICE — SU VICRYL 0 CT-1 27" J340H

## (undated) DEVICE — NDL 19GA 1.5"

## (undated) DEVICE — WIPES FOLEY CARE SURESTEP PROVON DFC100

## (undated) DEVICE — SOL NACL 0.9% IRRIG 1000ML BOTTLE 2F7124

## (undated) DEVICE — SU MONOCRYL 3-0 PS-2 27" Y427H

## (undated) DEVICE — BLADE CLIPPER SGL USE 9680

## (undated) DEVICE — GLOVE PROTEXIS W/NEU-THERA 8.5  2D73TE85

## (undated) DEVICE — BUR STRK ROUND 4.8X51.2MM FAST CUT 8 FLUTE 1608-006-139

## (undated) DEVICE — BONE CLEANING TIP INTERPULSE  0210-010-000

## (undated) DEVICE — HOOD FLYTE W/PEELAWAY 408-800-100

## (undated) DEVICE — GOWN IMPERVIOUS SPECIALTY XL/XLONG 39049

## (undated) DEVICE — BNDG COBAN 6"X5YDS STERILE

## (undated) DEVICE — GLOVE PROTEXIS BLUE W/NEU-THERA 6.5  2D73EB65

## (undated) DEVICE — DRAIN ROUND W/RESERV KIT JACKSON PRATT 10FR 400ML SU130-402D

## (undated) DEVICE — NDL 20GA 1.5"

## (undated) DEVICE — SU STRATAFIX PDS PLUS 0 CT-1 30CM SXPP1B450

## (undated) DEVICE — DRSG ADAPTIC 3X8" 6113

## (undated) DEVICE — DRAPE IOBAN ISOLATION VERTICAL 320X21CM 6617

## (undated) DEVICE — SYR 50ML CATH TIP W/O NDL 309620

## (undated) DEVICE — ESU CLEANER TIP 31142717

## (undated) RX ORDER — LIDOCAINE HYDROCHLORIDE 20 MG/ML
INJECTION, SOLUTION EPIDURAL; INFILTRATION; INTRACAUDAL; PERINEURAL
Status: DISPENSED
Start: 2019-05-06

## (undated) RX ORDER — DEXAMETHASONE SODIUM PHOSPHATE 4 MG/ML
INJECTION, SOLUTION INTRA-ARTICULAR; INTRALESIONAL; INTRAMUSCULAR; INTRAVENOUS; SOFT TISSUE
Status: DISPENSED
Start: 2024-05-29

## (undated) RX ORDER — CEFAZOLIN SODIUM 1 G/3ML
INJECTION, POWDER, FOR SOLUTION INTRAMUSCULAR; INTRAVENOUS
Status: DISPENSED
Start: 2019-05-06

## (undated) RX ORDER — FENTANYL CITRATE 50 UG/ML
INJECTION, SOLUTION INTRAMUSCULAR; INTRAVENOUS
Status: DISPENSED
Start: 2019-05-06

## (undated) RX ORDER — HYDROMORPHONE HYDROCHLORIDE 1 MG/ML
INJECTION, SOLUTION INTRAMUSCULAR; INTRAVENOUS; SUBCUTANEOUS
Status: DISPENSED
Start: 2024-05-29

## (undated) RX ORDER — LIDOCAINE HYDROCHLORIDE 20 MG/ML
JELLY TOPICAL
Status: DISPENSED
Start: 2019-05-06

## (undated) RX ORDER — ONDANSETRON 2 MG/ML
INJECTION INTRAMUSCULAR; INTRAVENOUS
Status: DISPENSED
Start: 2019-05-06

## (undated) RX ORDER — PREGABALIN 75 MG/1
CAPSULE ORAL
Status: DISPENSED
Start: 2019-05-06

## (undated) RX ORDER — VECURONIUM BROMIDE 1 MG/ML
INJECTION, POWDER, LYOPHILIZED, FOR SOLUTION INTRAVENOUS
Status: DISPENSED
Start: 2019-05-06

## (undated) RX ORDER — HYDROMORPHONE HYDROCHLORIDE 1 MG/ML
INJECTION, SOLUTION INTRAMUSCULAR; INTRAVENOUS; SUBCUTANEOUS
Status: DISPENSED
Start: 2019-05-06

## (undated) RX ORDER — BUPIVACAINE HYDROCHLORIDE AND EPINEPHRINE 5; 5 MG/ML; UG/ML
INJECTION, SOLUTION EPIDURAL; INTRACAUDAL; PERINEURAL
Status: DISPENSED
Start: 2019-05-06

## (undated) RX ORDER — GLYCOPYRROLATE 0.2 MG/ML
INJECTION, SOLUTION INTRAMUSCULAR; INTRAVENOUS
Status: DISPENSED
Start: 2019-05-06

## (undated) RX ORDER — CEFAZOLIN SODIUM/WATER 2 G/20 ML
SYRINGE (ML) INTRAVENOUS
Status: DISPENSED
Start: 2024-05-29

## (undated) RX ORDER — ACETAMINOPHEN 500 MG
TABLET ORAL
Status: DISPENSED
Start: 2019-05-06

## (undated) RX ORDER — FENTANYL CITRATE 0.05 MG/ML
INJECTION, SOLUTION INTRAMUSCULAR; INTRAVENOUS
Status: DISPENSED
Start: 2024-05-29

## (undated) RX ORDER — VANCOMYCIN HYDROCHLORIDE 1 G/20ML
INJECTION, POWDER, LYOPHILIZED, FOR SOLUTION INTRAVENOUS
Status: DISPENSED
Start: 2019-05-06

## (undated) RX ORDER — ALBUMIN, HUMAN INJ 5% 5 %
SOLUTION INTRAVENOUS
Status: DISPENSED
Start: 2019-05-06

## (undated) RX ORDER — KETOROLAC TROMETHAMINE 30 MG/ML
INJECTION, SOLUTION INTRAMUSCULAR; INTRAVENOUS
Status: DISPENSED
Start: 2019-05-06

## (undated) RX ORDER — PROPOFOL 10 MG/ML
INJECTION, EMULSION INTRAVENOUS
Status: DISPENSED
Start: 2019-05-06

## (undated) RX ORDER — CEFAZOLIN SODIUM 2 G/100ML
INJECTION, SOLUTION INTRAVENOUS
Status: DISPENSED
Start: 2019-05-06

## (undated) RX ORDER — NEOSTIGMINE METHYLSULFATE 1 MG/ML
VIAL (ML) INJECTION
Status: DISPENSED
Start: 2019-05-06

## (undated) RX ORDER — EPHEDRINE SULFATE 50 MG/ML
INJECTION, SOLUTION INTRAMUSCULAR; INTRAVENOUS; SUBCUTANEOUS
Status: DISPENSED
Start: 2024-05-29

## (undated) RX ORDER — FENTANYL CITRATE 50 UG/ML
INJECTION, SOLUTION INTRAMUSCULAR; INTRAVENOUS
Status: DISPENSED
Start: 2024-05-29

## (undated) RX ORDER — HYDROMORPHONE HCL IN WATER/PF 6 MG/30 ML
PATIENT CONTROLLED ANALGESIA SYRINGE INTRAVENOUS
Status: DISPENSED
Start: 2024-05-29

## (undated) RX ORDER — PROPOFOL 10 MG/ML
INJECTION, EMULSION INTRAVENOUS
Status: DISPENSED
Start: 2024-05-29

## (undated) RX ORDER — DEXAMETHASONE SODIUM PHOSPHATE 4 MG/ML
INJECTION, SOLUTION INTRA-ARTICULAR; INTRALESIONAL; INTRAMUSCULAR; INTRAVENOUS; SOFT TISSUE
Status: DISPENSED
Start: 2019-05-06

## (undated) RX ORDER — ACYCLOVIR 200 MG/1
CAPSULE ORAL
Status: DISPENSED
Start: 2019-05-06